# Patient Record
Sex: MALE | Race: BLACK OR AFRICAN AMERICAN | Employment: UNEMPLOYED | URBAN - METROPOLITAN AREA
[De-identification: names, ages, dates, MRNs, and addresses within clinical notes are randomized per-mention and may not be internally consistent; named-entity substitution may affect disease eponyms.]

---

## 2019-08-24 ENCOUNTER — APPOINTMENT (OUTPATIENT)
Dept: CT IMAGING | Age: 64
DRG: 389 | End: 2019-08-24
Attending: EMERGENCY MEDICINE
Payer: MEDICARE

## 2019-08-24 ENCOUNTER — HOSPITAL ENCOUNTER (INPATIENT)
Age: 64
LOS: 4 days | Discharge: LEFT BEFORE DISCHARGE | DRG: 389 | End: 2019-08-28
Attending: EMERGENCY MEDICINE | Admitting: FAMILY MEDICINE
Payer: MEDICARE

## 2019-08-24 ENCOUNTER — APPOINTMENT (OUTPATIENT)
Dept: GENERAL RADIOLOGY | Age: 64
DRG: 389 | End: 2019-08-24
Attending: EMERGENCY MEDICINE
Payer: MEDICARE

## 2019-08-24 ENCOUNTER — APPOINTMENT (OUTPATIENT)
Dept: GENERAL RADIOLOGY | Age: 64
DRG: 389 | End: 2019-08-24
Payer: MEDICARE

## 2019-08-24 DIAGNOSIS — R10.9 ACUTE ABDOMINAL PAIN: Primary | ICD-10-CM

## 2019-08-24 DIAGNOSIS — K56.609 SMALL BOWEL OBSTRUCTION (HCC): ICD-10-CM

## 2019-08-24 DIAGNOSIS — R11.2 NAUSEA AND VOMITING, INTRACTABILITY OF VOMITING NOT SPECIFIED, UNSPECIFIED VOMITING TYPE: ICD-10-CM

## 2019-08-24 PROBLEM — E11.9 DIABETES (HCC): Chronic | Status: ACTIVE | Noted: 2019-08-24

## 2019-08-24 PROBLEM — G47.30 UNSPECIFIED SLEEP APNEA: Chronic | Status: ACTIVE | Noted: 2019-08-24

## 2019-08-24 PROBLEM — E66.01 MORBID OBESITY (HCC): Chronic | Status: ACTIVE | Noted: 2019-08-24

## 2019-08-24 PROBLEM — N17.9 ACUTE RENAL FAILURE SUPERIMPOSED ON STAGE 4 CHRONIC KIDNEY DISEASE (HCC): Status: ACTIVE | Noted: 2019-08-24

## 2019-08-24 PROBLEM — R19.7 DIARRHEA: Status: ACTIVE | Noted: 2019-08-24

## 2019-08-24 PROBLEM — N18.9 ACUTE ON CHRONIC RENAL FAILURE (HCC): Chronic | Status: ACTIVE | Noted: 2019-08-24

## 2019-08-24 PROBLEM — K43.2 RECURRENT VENTRAL HERNIA: Status: ACTIVE | Noted: 2019-08-24

## 2019-08-24 PROBLEM — R74.8 ELEVATED LIPASE: Status: ACTIVE | Noted: 2019-08-24

## 2019-08-24 PROBLEM — N17.9 ACUTE ON CHRONIC RENAL FAILURE (HCC): Chronic | Status: ACTIVE | Noted: 2019-08-24

## 2019-08-24 PROBLEM — K46.9 HERNIA OF ABDOMINAL CAVITY: Chronic | Status: ACTIVE | Noted: 2019-08-24

## 2019-08-24 PROBLEM — I10 HYPERTENSION: Chronic | Status: ACTIVE | Noted: 2019-08-24

## 2019-08-24 PROBLEM — N18.4 ACUTE RENAL FAILURE SUPERIMPOSED ON STAGE 4 CHRONIC KIDNEY DISEASE (HCC): Status: ACTIVE | Noted: 2019-08-24

## 2019-08-24 PROBLEM — R63.4 UNINTENTIONAL WEIGHT LOSS: Status: ACTIVE | Noted: 2019-08-24

## 2019-08-24 PROBLEM — R53.1 WEAKNESS: Status: ACTIVE | Noted: 2019-08-24

## 2019-08-24 PROBLEM — D64.9 ANEMIA: Status: ACTIVE | Noted: 2019-08-24

## 2019-08-24 PROBLEM — K59.00 CONSTIPATION: Status: ACTIVE | Noted: 2019-08-24

## 2019-08-24 LAB
ALBUMIN SERPL-MCNC: 3.2 G/DL (ref 3.2–4.6)
ALBUMIN/GLOB SERPL: 0.6 {RATIO} (ref 1.2–3.5)
ALP SERPL-CCNC: 158 U/L (ref 50–136)
ALT SERPL-CCNC: 23 U/L (ref 12–65)
ANION GAP SERPL CALC-SCNC: 11 MMOL/L (ref 7–16)
AST SERPL-CCNC: 30 U/L (ref 15–37)
ATRIAL RATE: 106 BPM
BASOPHILS # BLD: 0 K/UL (ref 0–0.2)
BASOPHILS NFR BLD: 0 % (ref 0–2)
BILIRUB SERPL-MCNC: 0.6 MG/DL (ref 0.2–1.1)
BNP SERPL-MCNC: 38 PG/ML
BUN SERPL-MCNC: 72 MG/DL (ref 8–23)
CALCIUM SERPL-MCNC: 9.2 MG/DL (ref 8.3–10.4)
CALCULATED P AXIS, ECG09: 31 DEGREES
CALCULATED R AXIS, ECG10: -3 DEGREES
CALCULATED T AXIS, ECG11: 20 DEGREES
CHLORIDE SERPL-SCNC: 99 MMOL/L (ref 98–107)
CO2 SERPL-SCNC: 25 MMOL/L (ref 21–32)
CREAT SERPL-MCNC: 2.87 MG/DL (ref 0.8–1.5)
DIAGNOSIS, 93000: NORMAL
DIFFERENTIAL METHOD BLD: ABNORMAL
EOSINOPHIL # BLD: 0.1 K/UL (ref 0–0.8)
EOSINOPHIL NFR BLD: 1 % (ref 0.5–7.8)
ERYTHROCYTE [DISTWIDTH] IN BLOOD BY AUTOMATED COUNT: 15.1 % (ref 11.9–14.6)
EST. AVERAGE GLUCOSE BLD GHB EST-MCNC: 108 MG/DL
GLOBULIN SER CALC-MCNC: 5 G/DL (ref 2.3–3.5)
GLUCOSE BLD STRIP.AUTO-MCNC: 83 MG/DL (ref 65–100)
GLUCOSE SERPL-MCNC: 119 MG/DL (ref 65–100)
HBA1C MFR BLD: 5.4 % (ref 4.8–6)
HCT VFR BLD AUTO: 34.5 % (ref 41.1–50.3)
HGB BLD-MCNC: 11 G/DL (ref 13.6–17.2)
IMM GRANULOCYTES # BLD AUTO: 0.1 K/UL (ref 0–0.5)
IMM GRANULOCYTES NFR BLD AUTO: 1 % (ref 0–5)
INR PPP: 1
LIPASE SERPL-CCNC: 508 U/L (ref 73–393)
LYMPHOCYTES # BLD: 1.8 K/UL (ref 0.5–4.6)
LYMPHOCYTES NFR BLD: 20 % (ref 13–44)
MAGNESIUM SERPL-MCNC: 2 MG/DL (ref 1.8–2.4)
MCH RBC QN AUTO: 30 PG (ref 26.1–32.9)
MCHC RBC AUTO-ENTMCNC: 31.9 G/DL (ref 31.4–35)
MCV RBC AUTO: 94 FL (ref 79.6–97.8)
MONOCYTES # BLD: 0.9 K/UL (ref 0.1–1.3)
MONOCYTES NFR BLD: 10 % (ref 4–12)
NEUTS SEG # BLD: 6.2 K/UL (ref 1.7–8.2)
NEUTS SEG NFR BLD: 68 % (ref 43–78)
NRBC # BLD: 0 K/UL (ref 0–0.2)
P-R INTERVAL, ECG05: 136 MS
PHOSPHATE SERPL-MCNC: 3.5 MG/DL (ref 2.3–3.7)
PLATELET # BLD AUTO: 522 K/UL (ref 150–450)
PMV BLD AUTO: 11.8 FL (ref 9.4–12.3)
POTASSIUM SERPL-SCNC: 4.4 MMOL/L (ref 3.5–5.1)
PROT SERPL-MCNC: 8.2 G/DL (ref 6.3–8.2)
PROTHROMBIN TIME: 13.2 SEC (ref 11.7–14.5)
Q-T INTERVAL, ECG07: 356 MS
QRS DURATION, ECG06: 88 MS
QTC CALCULATION (BEZET), ECG08: 463 MS
RBC # BLD AUTO: 3.67 M/UL (ref 4.23–5.6)
SODIUM SERPL-SCNC: 135 MMOL/L (ref 136–145)
TROPONIN I BLD-MCNC: 0.01 NG/ML (ref 0.02–0.05)
VENTRICULAR RATE, ECG03: 102 BPM
WBC # BLD AUTO: 9.1 K/UL (ref 4.3–11.1)

## 2019-08-24 PROCEDURE — 74011250636 HC RX REV CODE- 250/636: Performed by: NURSE PRACTITIONER

## 2019-08-24 PROCEDURE — 83880 ASSAY OF NATRIURETIC PEPTIDE: CPT

## 2019-08-24 PROCEDURE — 80053 COMPREHEN METABOLIC PANEL: CPT

## 2019-08-24 PROCEDURE — 96375 TX/PRO/DX INJ NEW DRUG ADDON: CPT | Performed by: EMERGENCY MEDICINE

## 2019-08-24 PROCEDURE — 99285 EMERGENCY DEPT VISIT HI MDM: CPT | Performed by: EMERGENCY MEDICINE

## 2019-08-24 PROCEDURE — 84100 ASSAY OF PHOSPHORUS: CPT

## 2019-08-24 PROCEDURE — 85610 PROTHROMBIN TIME: CPT

## 2019-08-24 PROCEDURE — 77030008771 HC TU NG SALEM SUMP -A

## 2019-08-24 PROCEDURE — 87324 CLOSTRIDIUM AG IA: CPT

## 2019-08-24 PROCEDURE — 74011636320 HC RX REV CODE- 636/320: Performed by: EMERGENCY MEDICINE

## 2019-08-24 PROCEDURE — 81003 URINALYSIS AUTO W/O SCOPE: CPT | Performed by: EMERGENCY MEDICINE

## 2019-08-24 PROCEDURE — 74011000302 HC RX REV CODE- 302: Performed by: NURSE PRACTITIONER

## 2019-08-24 PROCEDURE — 77030019605

## 2019-08-24 PROCEDURE — 93005 ELECTROCARDIOGRAM TRACING: CPT | Performed by: EMERGENCY MEDICINE

## 2019-08-24 PROCEDURE — 86580 TB INTRADERMAL TEST: CPT | Performed by: NURSE PRACTITIONER

## 2019-08-24 PROCEDURE — 87045 FECES CULTURE AEROBIC BACT: CPT

## 2019-08-24 PROCEDURE — 71045 X-RAY EXAM CHEST 1 VIEW: CPT

## 2019-08-24 PROCEDURE — 96374 THER/PROPH/DIAG INJ IV PUSH: CPT | Performed by: EMERGENCY MEDICINE

## 2019-08-24 PROCEDURE — 82962 GLUCOSE BLOOD TEST: CPT

## 2019-08-24 PROCEDURE — 85025 COMPLETE CBC W/AUTO DIFF WBC: CPT

## 2019-08-24 PROCEDURE — 74018 RADEX ABDOMEN 1 VIEW: CPT

## 2019-08-24 PROCEDURE — 74011250636 HC RX REV CODE- 250/636: Performed by: FAMILY MEDICINE

## 2019-08-24 PROCEDURE — 74011250636 HC RX REV CODE- 250/636: Performed by: EMERGENCY MEDICINE

## 2019-08-24 PROCEDURE — 83690 ASSAY OF LIPASE: CPT

## 2019-08-24 PROCEDURE — 82272 OCCULT BLD FECES 1-3 TESTS: CPT

## 2019-08-24 PROCEDURE — 77030020255 HC SOL INJ LR 1000ML BG

## 2019-08-24 PROCEDURE — 83735 ASSAY OF MAGNESIUM: CPT

## 2019-08-24 PROCEDURE — 83036 HEMOGLOBIN GLYCOSYLATED A1C: CPT

## 2019-08-24 PROCEDURE — 74176 CT ABD & PELVIS W/O CONTRAST: CPT

## 2019-08-24 PROCEDURE — 84484 ASSAY OF TROPONIN QUANT: CPT

## 2019-08-24 PROCEDURE — 65270000029 HC RM PRIVATE

## 2019-08-24 PROCEDURE — 36415 COLL VENOUS BLD VENIPUNCTURE: CPT

## 2019-08-24 RX ORDER — MORPHINE SULFATE 2 MG/ML
2 INJECTION, SOLUTION INTRAMUSCULAR; INTRAVENOUS
Status: COMPLETED | OUTPATIENT
Start: 2019-08-24 | End: 2019-08-24

## 2019-08-24 RX ORDER — OMEPRAZOLE 20 MG/1
20 CAPSULE, DELAYED RELEASE ORAL DAILY
COMMUNITY

## 2019-08-24 RX ORDER — ONDANSETRON 2 MG/ML
4 INJECTION INTRAMUSCULAR; INTRAVENOUS
Status: COMPLETED | OUTPATIENT
Start: 2019-08-24 | End: 2019-08-24

## 2019-08-24 RX ORDER — DIPHENHYDRAMINE HYDROCHLORIDE 50 MG/ML
12.5 INJECTION, SOLUTION INTRAMUSCULAR; INTRAVENOUS
Status: DISCONTINUED | OUTPATIENT
Start: 2019-08-24 | End: 2019-08-28 | Stop reason: HOSPADM

## 2019-08-24 RX ORDER — MORPHINE SULFATE 2 MG/ML
8 INJECTION, SOLUTION INTRAMUSCULAR; INTRAVENOUS
Status: DISCONTINUED | OUTPATIENT
Start: 2019-08-24 | End: 2019-08-26

## 2019-08-24 RX ORDER — LORAZEPAM 2 MG/ML
1 INJECTION INTRAMUSCULAR
Status: DISCONTINUED | OUTPATIENT
Start: 2019-08-24 | End: 2019-08-28 | Stop reason: HOSPADM

## 2019-08-24 RX ORDER — NALOXONE HYDROCHLORIDE 0.4 MG/ML
0.4 INJECTION, SOLUTION INTRAMUSCULAR; INTRAVENOUS; SUBCUTANEOUS AS NEEDED
Status: DISCONTINUED | OUTPATIENT
Start: 2019-08-24 | End: 2019-08-28 | Stop reason: HOSPADM

## 2019-08-24 RX ORDER — HEPARIN SODIUM 5000 [USP'U]/ML
5000 INJECTION, SOLUTION INTRAVENOUS; SUBCUTANEOUS EVERY 8 HOURS
Status: DISCONTINUED | OUTPATIENT
Start: 2019-08-24 | End: 2019-08-28 | Stop reason: HOSPADM

## 2019-08-24 RX ORDER — ALLOPURINOL 300 MG/1
300 TABLET ORAL DAILY
COMMUNITY

## 2019-08-24 RX ORDER — COLCHICINE 0.6 MG/1
0.6 TABLET ORAL
COMMUNITY

## 2019-08-24 RX ORDER — LANOLIN ALCOHOL/MO/W.PET/CERES
1000 CREAM (GRAM) TOPICAL DAILY
COMMUNITY

## 2019-08-24 RX ORDER — HYDROMORPHONE HYDROCHLORIDE 1 MG/ML
1 INJECTION, SOLUTION INTRAMUSCULAR; INTRAVENOUS; SUBCUTANEOUS
Status: DISCONTINUED | OUTPATIENT
Start: 2019-08-24 | End: 2019-08-24

## 2019-08-24 RX ORDER — METHOCARBAMOL 500 MG/1
500 TABLET, FILM COATED ORAL
COMMUNITY

## 2019-08-24 RX ORDER — SODIUM BICARBONATE 650 MG/1
650 TABLET ORAL 4 TIMES DAILY
COMMUNITY

## 2019-08-24 RX ORDER — FUROSEMIDE 40 MG/1
40 TABLET ORAL AS NEEDED
COMMUNITY

## 2019-08-24 RX ORDER — ONDANSETRON 2 MG/ML
4 INJECTION INTRAMUSCULAR; INTRAVENOUS
Status: DISCONTINUED | OUTPATIENT
Start: 2019-08-24 | End: 2019-08-28 | Stop reason: HOSPADM

## 2019-08-24 RX ORDER — CARVEDILOL 12.5 MG/1
12.5 TABLET ORAL 2 TIMES DAILY WITH MEALS
COMMUNITY

## 2019-08-24 RX ORDER — HYDRALAZINE HYDROCHLORIDE 20 MG/ML
10 INJECTION INTRAMUSCULAR; INTRAVENOUS
Status: DISCONTINUED | OUTPATIENT
Start: 2019-08-24 | End: 2019-08-28 | Stop reason: HOSPADM

## 2019-08-24 RX ORDER — SODIUM CHLORIDE, SODIUM LACTATE, POTASSIUM CHLORIDE, CALCIUM CHLORIDE 600; 310; 30; 20 MG/100ML; MG/100ML; MG/100ML; MG/100ML
150 INJECTION, SOLUTION INTRAVENOUS CONTINUOUS
Status: DISCONTINUED | OUTPATIENT
Start: 2019-08-24 | End: 2019-08-25

## 2019-08-24 RX ORDER — INSULIN LISPRO 100 [IU]/ML
INJECTION, SOLUTION INTRAVENOUS; SUBCUTANEOUS
Status: DISCONTINUED | OUTPATIENT
Start: 2019-08-24 | End: 2019-08-27

## 2019-08-24 RX ORDER — NIFEDIPINE 60 MG/1
60 TABLET, EXTENDED RELEASE ORAL DAILY
COMMUNITY

## 2019-08-24 RX ORDER — HYDRALAZINE HYDROCHLORIDE 20 MG/ML
20 INJECTION INTRAMUSCULAR; INTRAVENOUS
Status: COMPLETED | OUTPATIENT
Start: 2019-08-24 | End: 2019-08-24

## 2019-08-24 RX ORDER — CYANOCOBALAMIN (VITAMIN B-12) 1000 MCG
1 TABLET, EXTENDED RELEASE ORAL
COMMUNITY

## 2019-08-24 RX ADMIN — MORPHINE SULFATE 2 MG: 2 INJECTION, SOLUTION INTRAMUSCULAR; INTRAVENOUS at 11:50

## 2019-08-24 RX ADMIN — DIATRIZOATE MEGLUMINE AND DIATRIZOATE SODIUM 15 ML: 660; 100 LIQUID ORAL; RECTAL at 11:50

## 2019-08-24 RX ADMIN — ONDANSETRON 4 MG: 2 INJECTION INTRAMUSCULAR; INTRAVENOUS at 15:41

## 2019-08-24 RX ADMIN — HYDRALAZINE HYDROCHLORIDE 20 MG: 20 INJECTION INTRAMUSCULAR; INTRAVENOUS at 11:49

## 2019-08-24 RX ADMIN — MORPHINE SULFATE 8 MG: 2 INJECTION, SOLUTION INTRAMUSCULAR; INTRAVENOUS at 21:31

## 2019-08-24 RX ADMIN — ONDANSETRON 4 MG: 2 INJECTION INTRAMUSCULAR; INTRAVENOUS at 11:50

## 2019-08-24 RX ADMIN — SODIUM CHLORIDE, SODIUM LACTATE, POTASSIUM CHLORIDE, AND CALCIUM CHLORIDE 125 ML/HR: 600; 310; 30; 20 INJECTION, SOLUTION INTRAVENOUS at 17:30

## 2019-08-24 RX ADMIN — TUBERCULIN PURIFIED PROTEIN DERIVATIVE 5 UNITS: 5 INJECTION, SOLUTION INTRADERMAL at 17:28

## 2019-08-24 RX ADMIN — HEPARIN SODIUM 5000 UNITS: 5000 INJECTION INTRAVENOUS; SUBCUTANEOUS at 17:28

## 2019-08-24 NOTE — H&P
Hospitalist Admission History and Physical       CHIEF COMPLAINT:  Post op intractable nausea and vomiting, abdominal pain. Subjective:   Patient is a morbidly obese 71 y.o.  male who presents to ER this afternoon with an approximate 6 day history of waxing and waning diffuse, crampy abdominal pain, \"all over,\" along with intractable nausea and vomiting. Reports the worst of the pain as just under diaphragm. He has not retained solids or liquids in that time frame. He reports a very small amount semi-formed stool expelled about 3 days ago, prior to that he has had brown and yellow liquid stools in very small amounts. Rarely passing gas. He had a lap umbilical hernia repair and a posterior lipoma resection in July at [facility] that seems to have been incarcerated, then patient describes some type of right lower quad abscess (?) aspiration 2 days post op. He reports he never had a normal BM post op and was readmitted for ten days and two days respectively for persistent SBO with questionable post op ventral hernia. Last admission 8/15-18 at [facility]. He states he felt marginally better on discharge, but when he attempted to eat again, began to vomit. He signed out AMA from the most recent admission Both admissions, he was treated conservatively with bowel rest, N/G, antiemetics and IVF. In ER, he is found with persistent SBO, N/G placed with gastric fluid return, and administered antiemetics. Additional history as noted below. Past Medical History:   Diagnosis Date    Arthritis     Chronic pain     arthritic    CKD (chronic kidney disease):  Stage 3-4 2018    BASELINE CREATININE IN LOW 2'S    Colon polyps 2018    Hernia of abdominal cavity 70/90/9250    POST OP UMBILICAL HERNIA 7/75 WITH RESULTANT VENTRAL HERNIA, NON INCARCERATED.      Hypertension     ON MEDS    Morbid obesity ([address]     BMI 47    Non-insulin dependent type 2 diabetes mellitus ([facility] [address]     WAS TOLD HE DID NOT HAVE DM ANY MORE IN [place] OF 2019. UNKNOWN A1 C    Unintentional weight loss 8/24/2019    50# POST OP July-AUGUST 2019    Unspecified sleep apnea     does not require a c-pap    Ventral hernia JULY 2019 POST OP    OF NOTE:  1  1. Took glipizide and metformin until July of this year, PHP told him he was no longer diabetic. 2.  Also took lisinopril, maxide, hydralazine, and lasix 80 mg daily until July of this year also, states he was taken off both by [facility]. Occasionally takes lasix 40 mg now. 3.  Suspect strong component of noncompliance. Past Surgical History:   Procedure Laterality Date    HX COLONOSCOPY      HX HERNIA REPAIR  07/2019        Family History   Problem Relation Age of Onset    Diabetes Mother     Cancer Sister     Stroke Sister     Heart Disease Sister        Social History     Social History Narrative    8/24:  PATIENT IS RETIRED, LIVES WITH WIFE Eva Wright. ([phone]). HE DOES NOT HAVE ANY CHILDREN. Social History     Substance and Sexual Activity   Drug Use No       Allergies   Allergen Reactions    Pcn [Penicillins] Hives       Prior to Admission medications    Medication Sig Start Date End Date Taking? Authorizing Provider   carvedilol (COREG) 12.5 mg tablet Take 12.5 mg by mouth two (2) times daily (with meals). Yes Provider, Historical   furosemide (LASIX) 40 mg tablet Take 40 mg by mouth as needed (USUALLY TAKES 2X PER WEEK AS OF JULY 2019. WAS TAKING 80 MG DAILY UNTIL THEN). Yes Provider, Historical   ergocalciferol, vitamin D2, (CALCIDOL PO) Take 0.25 mg by mouth daily. Yes Provider, Historical   NIFEdipine ER (NIFEDICAL XL) 60 mg ER tablet Take 60 mg by mouth daily. Yes Provider, Historical   omeprazole (PRILOSEC) 20 mg capsule Take 20 mg by mouth daily. Yes Provider, Historical   methocarbamol (ROBAXIN) 500 mg tablet Take 500 mg by mouth three (3) times daily as needed for Other (MUSCLE SPASM).    Yes Provider, Historical   ferrous sulfate (IRON) 325 mg (65 mg iron) cpER Take 1 Tab by mouth nightly. Yes Provider, Historical   sodium bicarbonate 650 mg tablet Take 650 mg by mouth four (4) times daily. Yes Provider, Historical   allopurinol (ZYLOPRIM) 300 mg tablet Take 300 mg by mouth daily. Yes Provider, Historical   selenium 200 mcg cap Take 1 Cap by mouth every seven (7) days. Yes Provider, Historical   cyanocobalamin 1,000 mcg tablet Take 1,000 mcg by mouth daily. Yes Provider, Historical   GINSENG PO Take 250 mg by mouth two (2) times a week. Yes Provider, Historical   ubidecarenone/vitamin E mixed (COQ10  PO) Take 200 mg by mouth every seven (7) days. Yes Provider, Historical   colchicine 0.6 mg tablet Take 0.6 mg by mouth daily as needed. Yes Provider, Historical   aspirin 81 mg chewable tablet Take 81 mg by mouth daily. Holding for surgery    Other, MD Deepika     PHP:  Unable to remember in [facility]  Nephrology: Dr Royal Ellison in [facility]  Does not see a Cardiologist    Review of Systems  A comprehensive review of systems was negative except for that written in the HPI. Objective:     Visit Vitals  /85   Pulse 97   Temp 98.2 °F (36.8 °C)   Resp 16   Ht 5' 6\" (1.676 m)   Wt 113.4 kg (250 lb)   SpO2 98%   BMI 40.35 kg/m²      Data Review:   Recent Results (from the past 24 hour(s))   CBC WITH AUTOMATED DIFF    Collection Time: 08/24/19 11:25 AM   Result Value Ref Range    WBC 9.1 4.3 - 11.1 K/uL    RBC 3.67 (L) 4.23 - 5.6 M/uL    HGB 11.0 (L) 13.6 - 17.2 g/dL    HCT 34.5 (L) 41.1 - 50.3 %    MCV 94.0 79.6 - 97.8 FL    MCH 30.0 26.1 - 32.9 PG    MCHC 31.9 31.4 - 35.0 g/dL    RDW 15.1 (H) 11.9 - 14.6 %    PLATELET 710 (H) 119 - 450 K/uL    MPV 11.8 9.4 - 12.3 FL    ABSOLUTE NRBC 0.00 0.0 - 0.2 K/uL    DF AUTOMATED      NEUTROPHILS 68 43 - 78 %    LYMPHOCYTES 20 13 - 44 %    MONOCYTES 10 4.0 - 12.0 %    EOSINOPHILS 1 0.5 - 7.8 %    BASOPHILS 0 0.0 - 2.0 %    IMMATURE GRANULOCYTES 1 0.0 - 5.0 %    ABS. NEUTROPHILS 6.2 1.7 - 8.2 K/UL    ABS. LYMPHOCYTES 1.8 0.5 - 4.6 K/UL    ABS. MONOCYTES 0.9 0.1 - 1.3 K/UL    ABS. EOSINOPHILS 0.1 0.0 - 0.8 K/UL    ABS. BASOPHILS 0.0 0.0 - 0.2 K/UL    ABS. IMM. GRANS. 0.1 0.0 - 0.5 K/UL   METABOLIC PANEL, COMPREHENSIVE    Collection Time: 08/24/19 11:25 AM   Result Value Ref Range    Sodium 135 (L) 136 - 145 mmol/L    Potassium 4.4 3.5 - 5.1 mmol/L    Chloride 99 98 - 107 mmol/L    CO2 25 21 - 32 mmol/L    Anion gap 11 7 - 16 mmol/L    Glucose 119 (H) 65 - 100 mg/dL    BUN 72 (H) 8 - 23 MG/DL    Creatinine 2.87 (H) 0.8 - 1.5 MG/DL    GFR est AA 29 (L) >60 ml/min/1.73m2    GFR est non-AA 24 (L) >60 ml/min/1.73m2    Calcium 9.2 8.3 - 10.4 MG/DL    Bilirubin, total 0.6 0.2 - 1.1 MG/DL    ALT (SGPT) 23 12 - 65 U/L    AST (SGOT) 30 15 - 37 U/L    Alk. phosphatase 158 (H) 50 - 136 U/L    Protein, total 8.2 6.3 - 8.2 g/dL    Albumin 3.2 3.2 - 4.6 g/dL    Globulin 5.0 (H) 2.3 - 3.5 g/dL    A-G Ratio 0.6 (L) 1.2 - 3.5     LIPASE    Collection Time: 08/24/19 11:25 AM   Result Value Ref Range    Lipase 508 (H) 73 - 393 U/L   BNP    Collection Time: 08/24/19 11:25 AM   Result Value Ref Range    BNP 38 (H) 0 pg/mL   POC TROPONIN-I    Collection Time: 08/24/19 11:29 AM   Result Value Ref Range    Troponin-I (POC) 0.01 (L) 0.02 - 0.05 ng/ml   EKG, 12 LEAD, INITIAL    Collection Time: 08/24/19 11:35 AM   Result Value Ref Range    Ventricular Rate 102 BPM    Atrial Rate 106 BPM    P-R Interval 136 ms    QRS Duration 88 ms    Q-T Interval 356 ms    QTC Calculation (Bezet) 463 ms    Calculated P Axis 31 degrees    Calculated R Axis -3 degrees    Calculated T Axis 20 degrees    Diagnosis       !! AGE AND GENDER SPECIFIC ECG ANALYSIS !! Sinus tachycardia  Minimal voltage criteria for LVH, may be normal variant  Borderline ECG  No previous ECGs available       Old records reviewed. PHYSICAL EXAM:  General appearance: Oriented and alert, cooperative, ill appearing. Unkempt. Morbidly obese despite 50# weight loss in the past month. Wife at bedside. Head: Normocephalic, without obvious abnormality, atraumatic  Eyes: conjunctivae/corneas clear. PERRL  Throat: Lips, mucosa, and tongue normal. Teeth and gums dry. Neck: supple, symmetrical, trachea midline, no JVD  Lungs: clear to auscultation bilaterally, slightly diminished in bases. Heart: regular rate and rhythm, S1, S2 normal, no murmur, click, rub or gallop  Abdomen: slightly tense. No rebound or guarding, minimal increased pain with palpation. Rare, faint, tinkling bowel sounds noted. Worst pain in bilateral upper quads. Ventral hernia palpable, does not seem to be incarcerated. Extremities: extremities normal, atraumatic, no cyanosis or edema  Skin: Skin color, texture, turgor normal. No rashes or lesions  Neurologic: Grossly normal    Assessment/Plan:   Intractable nausea and vomiting, intermittent diarrhea post op    Dietician consult for possible TPN   Antiemetics and analgesics   PPI IV daily   N/G to LIS   Daily weight   LR at 125 ml/hr   NPO   I+O  Abdominal pain due to SBO    As above  Persistent, recurrent post op Small bowel obstruction   As above   If not resolving in a few days will need surgical consult   S/P umbilical hernia repair and posterior lipoma removal July 2019 with subsequent ventral hernia  Unspecified sleep apnea:  does not require a c-pap  Elevated lipase   Monitor   Morbid obesity with BMI 54   Reports unintentional 50# weight loss post op  Hypertension: on meds at home   Use IV hydralazine prn   NIDDM:  7/2019:  PATIENT INFORMED HE DOES NOT HAVE DIABETES ANY MORE.   GLUCOTROL AND METFORMIN DISCONTINUED at that time   Checking A1C   Routine SSI until A1C is resulted   Generalized weakness   PT/OT consults   CM for discharge planning   Fall precautions  Acute on chronic renal failure, stage 3-4:  Baseline creatinine in low 2's   Daily BMP   Continue IVF   I+O, daily weight   Anemia:  Suspect multifactorial   Monitor daily      Full code  Heparin and SCDs for DVT prophy   Seen and examined by Dr Jasson Ruff also  Plan discussed with Dr Jasson Ruff, Patient, wife, all are in agreement     Signed By: Yovani Tim NP     August 24, 2019

## 2019-08-24 NOTE — ED NOTES
Pt resting on stretcher at this time. S/O at bedside. Pt provided with warm blanket. Offered dim lights and pt declined. VSS and cycling.

## 2019-08-24 NOTE — CONSULTS
H&P/Consult Note/Progress Note/Office Note:   Michael Brennan  MRN: [id number]  :1955  Age:64 y.o.    HPI: Michael Brennan is a 71 y.o. male with h/o CHF, obesity, DM, CKD who recently had surgery at [facility].    19 s/p robotic assisted 8cm ventral hernia repair with 4.5cm x 11.4cm Ventralight STmesh at [facility]   (and benign lipoma resection from back) by Dr Mendy Galaviz ([phone])   DIAGNOSIS: BENIGN FATTY TISSUE, CONSISTENT WITH LIPOMA. Aide Vizcaino M.D.  (Electronically Signed)  He describes having a seroma aspirated by Dr Berenice Pedraza post-op. 8/15/19 He was admitted to [facility] with recurrent hernia and SBO and followed by surgery there with NGT  He did not have surgery and was discharged after approx 2 days    8/15/19 CT abd/pelvis at [facility]  IMPRESSION:  [address], SEEN ON THE PREVIOUS STUDY OF 2019, WITHOUT  BOWEL CONTENT BUT WITH SIGNIFICANT PERITONEAL FAT CONTENT, POSSIBLY RELATED TO  THE SMALL BOWEL OBSTRUCTION    INCIDENTALLY OBSERVED IS UNCOMPLICATED CHOLELITHIASIS    MODERATE BILATERAL RENAL ATROPHY      He came to the [facility] ER on 19 with progressive, constant, severe N/V/abd for >1 week. Abd pain involved upper abdomen and was generalized and non-radiating  CT is shown below with recurrent SBO  Nothing made symptoms better or worse. No associated fevers  He was admitted by Hospitalists  Gen Surgery was consulted by Dr Cyn Mina. Shreya Brody who called our service. NGT placed and palcement confirmed on abd Xray        19 CT abd/pelvis with oral but no IV contrast  Hx: Abd pain, generalized, with vomiting. Cough. 50 pound weight loss in 1month      FINDINGS: There is minimal bibasilar atelectasis.     CT ABD: No contour deforming abnormality of the liver. Multiple low-density  lesions seen within the spleen. Gallstones present in the gallbladder.  The  adrenal glands and pancreas are normal. No renal or ureteral calculi  demonstrated. There are multiple dilated loops of small bowel measuring up to 6  cm. It is difficult to identify a zone of transition, but multiple decompressed  loops of small bowel are present in the right lower quadrant, and there is  colonic decompression present as well. There is a fluid-filled supraumbilical  abdominal wall hernia present, midline.     CT PELVIS: The bladder and rectum are normal. No pelvic adenopathy demonstrated. Small pelvic free fluid present.     Bone window evaluation demonstrates no aggressive osseous lesions.       IMPRESSION:  1. Findings compatible with small bowel obstruction with multiple dilated loops  of small bowel seen within the upper abdomen. It is difficult to identify a zone  of transition, but multiple decompressed loops of small bowel are present in the  right lower quadrant. 2. Fluid-containing supraumbilical ventral abdominal wall hernia. 3. Cholelithiasis. 4. Multiple low-density lesions within the spleen which cannot be further  characterized on this exam. Correlation with targeted sonography, as an  outpatient on a nonemergent basis, recommended for further evaluation      He previously had colonoscopy at [facility] as shown below. 10/25/18 s/p colonoscopy at [facility]; Dr Joey Buenrostro MD  \"Normal\" ; no specimens; next planned in 5yrs          Past Medical History:   Diagnosis Date    Arthritis     Chronic pain     arthritic    CKD (chronic kidney disease) 2018    BASELINE CREATININE IN LOW 2'S    Colon polyps 2018    Hernia of abdominal cavity 80/82/0490    POST OP UMBILICAL HERNIA 2/16 WITH RESULTANT VENTRAL HERNIA, NON INCARCERATED.  Hypertension     ON MEDS    Morbid obesity ([address]     BMI 47    Non-insulin dependent type 2 diabetes mellitus ([address]     WAS TOLD HE DID NOT HAVE DM ANY MORE IN [place] OF 2019.   UNKNOWN A1 C    Unintentional weight loss 8/24/2019    50# POST OP July-AUGUST 2019    Unspecified sleep apnea     does not require a c-pap  Ventral hernia JULY 2019 POST OP     Past Surgical History:   Procedure Laterality Date    HX COLONOSCOPY      HX HERNIA REPAIR  07/2019     Current Facility-Administered Medications   Medication Dose Route Frequency    lactated Ringers infusion  125 mL/hr IntraVENous CONTINUOUS    naloxone (NARCAN) injection 0.4 mg  0.4 mg IntraVENous PRN    ondansetron (ZOFRAN) injection 4 mg  4 mg IntraVENous Q4H PRN    promethazine (PHENERGAN) with saline injection 12.5 mg  12.5 mg IntraVENous Q4H PRN    diphenhydrAMINE (BENADRYL) injection 12.5 mg  12.5 mg IntraVENous Q4H PRN    LORazepam (ATIVAN) injection 1 mg  1 mg IntraVENous Q6H PRN    heparin (porcine) injection 5,000 Units  5,000 Units SubCUTAneous Q8H    insulin lispro (HUMALOG) injection   SubCUTAneous AC&HS    tuberculin injection 5 Units  5 Units IntraDERMal ONCE    [START ON 8/25/2019] pantoprazole (PROTONIX) 40 mg in sodium chloride 0.9% 10 mL injection  40 mg IntraVENous DAILY    hydrALAZINE (APRESOLINE) 20 mg/mL injection 10 mg  10 mg IntraVENous Q6H PRN    morphine injection 8 mg  8 mg IntraVENous Q4H PRN     Pcn [penicillins]  Social History     Socioeconomic History    Marital status:      Spouse name: Not on file    Number of children: Not on file    Years of education: Not on file    Highest education level: Not on file   Tobacco Use    Smoking status: Never Smoker    Smokeless tobacco: Never Used   Substance and Sexual Activity    Alcohol use: No    Drug use: No   Social History Narrative    8/24:  PATIENT IS RETIRED, LIVES WITH WIFE PHAM. ([phone]). HE DOES NOT HAVE ANY CHILDREN. Social History     Tobacco Use   Smoking Status Never Smoker   Smokeless Tobacco Never Used     Family History   Problem Relation Age of Onset    Diabetes Mother     Cancer Sister     Stroke Sister     Heart Disease Sister      ROS: The patient has no difficulty with chest pain or shortness of breath. No fever or chills. Comprehensive review of systems was otherwise unremarkable except as noted above. Physical Exam:   Visit Vitals  /83   Pulse (!) 103   Temp 98.1 °F (36.7 °C)   Resp 20   Ht 5' 6\" (1.676 m)   Wt 250 lb (113.4 kg)   SpO2 97%   BMI 40.35 kg/m²     Vitals:    08/24/19 1559 08/24/19 1629 08/24/19 1722 08/24/19 1917   BP: 154/85 182/83 145/89 151/83   Pulse:  96 (!) 110 (!) 103   Resp:   19 20   Temp:   98.2 °F (36.8 °C) 98.1 °F (36.7 °C)   SpO2: 98% 97% 98% 97%   Weight:       Height:         No intake/output data recorded. No intake/output data recorded. Constitutional: Alert, oriented, cooperative patient in no acute distress; appears stated age    Eyes:Sclera are clear. EOMs intact  ENMT: no external lesions gross hearing normal; no obvious neck masses, no ear or lip lesions, nares normal; +NGT  CV: RRR. Normal perfusion  Resp: No JVD. Breathing is  non-labored; no audible wheezing. GI: obese, healed trochar sites; cannot palpate hernia but his obesity limits exam; Upper abdomen is tender; No guarding or rebound      Musculoskeletal: unremarkable with normal function. No embolic signs or cyanosis.    Neuro:  Oriented; moves all 4; no focal deficits  Psychiatric: normal affect and mood, no memory impairment    Recent vitals (if inpt):  Patient Vitals for the past 24 hrs:   BP Temp Pulse Resp SpO2 Height Weight   08/24/19 1917 151/83 98.1 °F (36.7 °C) (!) 103 20 97 %     08/24/19 1722 145/89 98.2 °F (36.8 °C) (!) 110 19 98 %     08/24/19 1629 182/83  96  97 %     08/24/19 1559 154/85    98 %     08/24/19 1541     100 %     08/24/19 1540     100 %     08/24/19 1531     98 %     08/24/19 1530 153/84    98 %     08/24/19 1330    16 98 %     08/24/19 1329 155/83   12 98 %     08/24/19 1314 142/71    98 %     08/24/19 1259 126/56    98 %     08/24/19 1244 122/56    99 %     08/24/19 1229 138/60    99 %     08/24/19 1149 (!) 163/104  97       08/24/19 1114 (!) 161/139 98.2 °F (36.8 °C) (!) 113 18 99 % 5' 6\" (1.676 m) 250 lb (113.4 kg)       Labs:  Recent Labs     08/24/19  1724 08/24/19  1125   WBC  --  9.1   HGB  --  11.0*   PLT  --  522*   NA  --  135*   K  --  4.4   CL  --  99   CO2  --  25   BUN  --  72*   CREA  --  2.87*   GLU  --  119*   PTP 13.2  --    INR 1.0  --    TBILI  --  0.6   SGOT  --  30   ALT  --  23   AP  --  158*   LPSE  --  508*       Lab Results   Component Value Date/Time    WBC 9.1 08/24/2019 11:25 AM    HGB 11.0 (L) 08/24/2019 11:25 AM    PLATELET 245 (H) 30/36/5997 11:25 AM    Sodium 135 (L) 08/24/2019 11:25 AM    Potassium 4.4 08/24/2019 11:25 AM    Chloride 99 08/24/2019 11:25 AM    CO2 25 08/24/2019 11:25 AM    BUN 72 (H) 08/24/2019 11:25 AM    Creatinine 2.87 (H) 08/24/2019 11:25 AM    Glucose 119 (H) 08/24/2019 11:25 AM    INR 1.0 08/24/2019 05:24 PM    Bilirubin, total 0.6 08/24/2019 11:25 AM    AST (SGOT) 30 08/24/2019 11:25 AM    ALT (SGPT) 23 08/24/2019 11:25 AM    Alk. phosphatase 158 (H) 08/24/2019 11:25 AM    Lipase 508 (H) 08/24/2019 11:25 AM       CT Results  (Last 48 hours)               08/24/19 1403  CT ABD PELV WO CONT Final result    Impression:  IMPRESSION:   1. Findings compatible with small bowel obstruction with multiple dilated loops   of small bowel seen within the upper abdomen. It is difficult to identify a zone   of transition, but multiple decompressed loops of small bowel are present in the   right lower quadrant. 2. Fluid-containing supraumbilical ventral abdominal wall hernia. 3. Cholelithiasis. 4. Multiple low-density lesions within the spleen which cannot be further   characterized on this exam. Correlation with targeted sonography, as an   outpatient on a nonemergent basis, recommended for further evaluation. Narrative:  History: Abdominal pain, generalized, with vomiting. Cough. 50 pound weight loss   in one month.        EXAM: CT abdomen and pelvis with oral contrast only TECHNIQUE: Thin section axial CT images are obtained from the lung bases through   the pubic symphysis. Radiation dose reduction techniques were used for this   study. Our CT scanners use one or all of the following: Automated exposure   control, adjustment of the mA and/or kV according to patient size, use of   iterative reconstruction. No comparison       FINDINGS: There is minimal bibasilar atelectasis. CT ABDOMEN: No contour deforming abnormality of the liver. Multiple low-density   lesions seen within the spleen. Gallstones present in the gallbladder. The   adrenal glands and pancreas are normal. No renal or ureteral calculi   demonstrated. There are multiple dilated loops of small bowel measuring up to 6   cm. It is difficult to identify a zone of transition, but multiple decompressed   loops of small bowel are present in the right lower quadrant, and there is   colonic decompression present as well. There is a fluid-filled supraumbilical   abdominal wall hernia present, midline. CT PELVIS: The bladder and rectum are normal. No pelvic adenopathy demonstrated. Small pelvic free fluid present. Bone window evaluation demonstrates no aggressive osseous lesions. chest X-ray      I reviewed recent labs, recent radiologic studies, and pertinent records including other doctor notes if needed. I independently reviewed radiology images for studies I described above or studies I have ordered.    Admission date (for inpatients): 8/24/2019   * No surgery found *  * No surgery found *    ASSESSMENT/PLAN:  Problem List  Date Reviewed: 8/24/2019          Codes Class Noted    * (Principal) Small bowel obstruction ([facility] [address] ICD-10-CM: U55.437  ICD-9-CM: 560.9  8/24/2019        Unspecified sleep apnea (Chronic) ICD-10-CM: G47.30  ICD-9-CM: 780.57  8/24/2019    Overview Signed 8/24/2019  4:31 PM by Do Devine NP     does not require a c-pap             Morbid obesity ([facility] [address] (Chronic) ICD-10-CM: E66.01  ICD-9-CM: 278.01  8/24/2019    Overview Signed 8/24/2019  4:32 PM by Zoe Montague NP     BMI 54  HAS LOST 50# POST OP July 2019 UNTIL AUGUST 24, 2019             Unintentional weight loss ICD-10-CM: R63.4  ICD-9-CM: 783.21  8/24/2019    Overview Signed 8/24/2019  4:33 PM by Zoe Montague NP     50# POST OP July-AUGUST 2019             Hypertension (Chronic) ICD-10-CM: I10  ICD-9-CM: 401.9  8/24/2019        Diabetes ([facility] [address] (Chronic) ICD-10-CM: E11.9  ICD-9-CM: 250.00  8/24/2019    Overview Signed 8/24/2019  4:34 PM by Zoe Montague NP     7/2019:  PATIENT INFORMED HE DOES NOT HAVE DIABETES ANY MORE. GLUCOTROL AND METFORMIN DISCONTINUED. Hernia of abdominal cavity (Chronic) ICD-10-CM: K46.9  ICD-9-CM: 553.9  8/24/2019    Overview Signed 8/24/2019  4:35 PM by Zoe Montague NP     POST OP UMBILICAL HERNIA 1/81 WITH RESULTANT VENTRAL HERNIA, NON INCARCERATED. Diarrhea ICD-10-CM: R19.7  ICD-9-CM: 787.91  8/24/2019    Overview Signed 8/24/2019  4:36 PM by Zoe Montague NP     INTERMITTENT SINCE SURGERY 7/2019             Weakness ICD-10-CM: R53.1  ICD-9-CM: 780.79  8/24/2019    Overview Signed 8/24/2019  4:36 PM by Zoe Montague NP     ARMS AND LEGS MOSTLY.               Acute renal failure superimposed on stage 4 chronic kidney disease ([facility] [address] ICD-10-CM: N17.9, N18.4  ICD-9-CM: 584.9, 585.4  8/24/2019    Overview Signed 8/24/2019  4:37 PM by SHAW Vargas  2'S  NEPHROLOGY:  YANIV LOYOLA              Intractable nausea and vomiting ICD-10-CM: R11.2  ICD-9-CM: 536.2  8/24/2019    Overview Signed 8/24/2019  4:37 PM by Zeo Montague NP     INTERMITTENTLY [facility] SURGERY July 2019             Abdominal pain (Chronic) ICD-10-CM: R10.9  ICD-9-CM: 789.00  8/24/2019        Constipation ICD-10-CM: K59.00  ICD-9-CM: 564.00  8/24/2019        Anemia ICD-10-CM: D64.9  ICD-9-CM: 285.9  8/24/2019 Elevated lipase ICD-10-CM: R74.8  ICD-9-CM: 790.5  8/24/2019        Recurrent ventral hernia ICD-10-CM: K43.2  ICD-9-CM: 553.21  8/24/2019            Principal Problem:    Small bowel obstruction ([facility] [address] (8/24/2019)    Active Problems:    Unspecified sleep apnea (8/24/2019)      Overview: does not require a c-pap      Morbid obesity ([facility] [address] [address] (8/24/2019)      Overview: BMI 54      HAS LOST 50# POST OP July 2019 UNTIL AUGUST 24, 2019      Unintentional weight loss (8/24/2019)      Overview: 50# POST OP July-AUGUST 2019      Hypertension (8/24/2019)      Diabetes ([facility] [address] (8/24/2019)      Overview: 7/2019:  PATIENT INFORMED HE DOES NOT HAVE DIABETES ANY MORE. GLUCOTROL       AND METFORMIN DISCONTINUED. Hernia of abdominal cavity (8/24/2019)      Overview: POST OP UMBILICAL HERNIA 3/75 WITH RESULTANT VENTRAL HERNIA, NON       INCARCERATED. Diarrhea (8/24/2019)      Overview: INTERMITTENT SINCE SURGERY 7/2019      Weakness (8/24/2019)      Overview: ARMS AND LEGS MOSTLY.        Acute renal failure superimposed on stage 4 chronic kidney disease ([facility] [address] (8/24/2019)      Overview: BASELINE CREATININE IN LOW 2'S      NEPHROLOGY:  YANIV MONTEJO S[place]       Intractable nausea and vomiting (8/24/2019)      Overview: INTERMITTENTLY SINCE SURGERY July 2019      Abdominal pain (8/24/2019)      Constipation (8/24/2019)      Anemia (8/24/2019)      Elevated lipase (8/24/2019)      Recurrent ventral hernia (8/24/2019)         Recurrent SBO  Recent robotic ventral hernia surgery with large piece of mesh at [facility] by Dr Parviz Dimas  He may have a mesh complication with bowel involvement  I told pt that I do not perform robotic ventral hernia repair  NPO/IVF  Bowel rest  NGT-->LISx    Possible hernia recurrence vs post-op seroma on CT  Suspect this is only a seroma and not a recurrent hernia based on hx and images  Follow    Renal Insufficiency  Unsure of baseline as we have no old labs  He thinks baseline Cr approx 2.5  Hydrate   Daily labs    Morbid obesity  Encourage weight loss              I have personally performed a face-to-face diagnostic evaluation and management  service on this patient. I have independently seen the patient. I have independently obtained the above history from the patient/family. I have independently examined the patient with above findings. I have independently reviewed data/labs for this patient and developed the above plan of care (MDM). Signed: Clint Reyes.  Bola Matos MD, FACS

## 2019-08-24 NOTE — ED TRIAGE NOTES
Pt states abdominal pain generalized in center, vomiting. Unable to keep anything down. Pt states he is coughing up white sputum. Pt states he was told yesterday by home health he may have heart failure based on his symptoms. States bad heartburn if he eats certain things. Pt states symptoms about a month. Denies diarrhea, states current constipation. States since he cannot eat, he has lost ~50 lbs in 1 month.

## 2019-08-24 NOTE — ED PROVIDER NOTES
41-year-old male states 3 weeks of central abdominal pain with daily episodes of vomiting. Possible fever and chills. No bowel movement in many days. He has had some cough with some slight white sputum. No particular shortness of breath. Has history of hypertension diabetes and reflux. History of renal insufficiency. Had hernia repair on July 14. Has tried gas pills without improvement. No bleeding. Chest pain or back pain. No particular difficulty urinating    The history is provided by the patient. Abdominal Pain    This is a new problem. The current episode started more than 1 week ago. The problem occurs constantly. The problem has not changed since onset. The pain is associated with vomiting. The pain is located in the generalized abdominal region. The quality of the pain is dull and cramping. The pain is moderate. Associated symptoms include nausea, vomiting and constipation. Pertinent negatives include no fever, no diarrhea, no hematochezia, no melena, no dysuria, no frequency, no hematuria, no headaches, no chest pain and no back pain. The pain is worsened by eating. The pain is relieved by nothing.         Past Medical History:   Diagnosis Date    Arthritis     Chronic pain     arthritic    Colon polyps     Diabetes ([facility] [address] [facility]     Hypertension     Morbid obesity ([facility] [address]     BMI 47    Unspecified sleep apnea     does not require a c-pap       Past Surgical History:   Procedure Laterality Date    HX COLONOSCOPY           Family History:   Problem Relation Age of Onset    Diabetes Mother     Cancer Sister     Stroke Sister     Heart Disease Sister        Social History     Socioeconomic History    Marital status:      Spouse name: Not on file    Number of children: Not on file    Years of education: Not on file    Highest education level: Not on file   Occupational History    Not on file   Social Needs    Financial resource strain: Not on file    Food insecurity:     Worry: Not on file     Inability: Not on file    Transportation needs:     Medical: Not on file     Non-medical: Not on file   Tobacco Use    Smoking status: Never Smoker    Smokeless tobacco: Never Used   Substance and Sexual Activity    Alcohol use: No    Drug use: No    Sexual activity: Not on file   Lifestyle    Physical activity:     Days per week: Not on file     Minutes per session: Not on file    Stress: Not on file   Relationships    Social connections:     Talks on phone: Not on file     Gets together: Not on file     Attends Christian service: Not on file     Active member of club or organization: Not on file     Attends meetings of clubs or organizations: Not on file     Relationship status: Not on file    Intimate partner violence:     Fear of current or ex partner: Not on file     Emotionally abused: Not on file     Physically abused: Not on file     Forced sexual activity: Not on file   Other Topics Concern    Not on file   Social History Narrative    Not on file         ALLERGIES: Pcn [penicillins]    Review of Systems   Constitutional: Negative for chills and fever. Respiratory: Positive for cough. Negative for shortness of breath. Cardiovascular: Negative for chest pain and palpitations. Gastrointestinal: Positive for abdominal pain, constipation, nausea and vomiting. Negative for diarrhea, hematochezia and melena. Genitourinary: Negative for dysuria, flank pain, frequency and hematuria. Musculoskeletal: Negative for back pain and neck pain. Skin: Negative for color change and rash. Neurological: Negative for syncope and headaches. All other systems reviewed and are negative. Vitals:    08/24/19 1114   BP: (!) 161/139   Pulse: (!) 113   Resp: 18   Temp: 98.2 °F (36.8 °C)   SpO2: 99%   Weight: 113.4 kg (250 lb)   Height: 5' 6\" (1.676 m)            Physical Exam   Constitutional: He is oriented to person, place, and time. He appears well-developed and well-nourished.  No distress. HENT:   Head: Normocephalic and atraumatic. Right Ear: External ear normal.   Left Ear: External ear normal.   Mouth/Throat: Oropharynx is clear and moist. No oropharyngeal exudate. Eyes: Pupils are equal, round, and reactive to light. Conjunctivae and EOM are normal.   Neck: Normal range of motion. Neck supple. Cardiovascular: Normal rate, regular rhythm and intact distal pulses. No murmur heard. Pulmonary/Chest: Breath sounds normal. No respiratory distress. Abdominal: Soft. Bowel sounds are normal. He exhibits no mass. There is generalized tenderness. There is no rebound and no guarding. No hernia. Very mild tenderness to abdominal exam.  Slight distention. Neurological: He is alert and oriented to person, place, and time. Gait normal.   Nl speech   Skin: Skin is warm and dry. Psychiatric: He has a normal mood and affect. His speech is normal.   Nursing note and vitals reviewed. MDM  Number of Diagnoses or Management Options  Diagnosis management comments: Imaging to assess for bowel obstruction. Check screening lab work. Blood pressure control. Amount and/or Complexity of Data Reviewed  Clinical lab tests: ordered and reviewed  Tests in the radiology section of CPT®: ordered and reviewed  Tests in the medicine section of CPT®: ordered and reviewed  Review and summarize past medical records: yes (Records reveal patient hospitalized in [facility] August 15-17 from small bowel obstruction discovered on CT scan. Patient left AMA.)  Independent visualization of images, tracings, or specimens: yes (EKG reveals sinus tachycardia 102.   No ST-T changes or ectopy.)    Risk of Complications, Morbidity, and/or Mortality  Presenting problems: moderate  Diagnostic procedures: low  Management options: moderate    Patient Progress  Patient progress: stable         Procedures    Results Include:    Recent Results (from the past 24 hour(s))   CBC WITH AUTOMATED DIFF    Collection Time: 08/24/19 11:25 AM   Result Value Ref Range    WBC 9.1 4.3 - 11.1 K/uL    RBC 3.67 (L) 4.23 - 5.6 M/uL    HGB 11.0 (L) 13.6 - 17.2 g/dL    HCT 34.5 (L) 41.1 - 50.3 %    MCV 94.0 79.6 - 97.8 FL    MCH 30.0 26.1 - 32.9 PG    MCHC 31.9 31.4 - 35.0 g/dL    RDW 15.1 (H) 11.9 - 14.6 %    PLATELET 332 (H) 243 - 450 K/uL    MPV 11.8 9.4 - 12.3 FL    ABSOLUTE NRBC 0.00 0.0 - 0.2 K/uL    DF AUTOMATED      NEUTROPHILS 68 43 - 78 %    LYMPHOCYTES 20 13 - 44 %    MONOCYTES 10 4.0 - 12.0 %    EOSINOPHILS 1 0.5 - 7.8 %    BASOPHILS 0 0.0 - 2.0 %    IMMATURE GRANULOCYTES 1 0.0 - 5.0 %    ABS. NEUTROPHILS 6.2 1.7 - 8.2 K/UL    ABS. LYMPHOCYTES 1.8 0.5 - 4.6 K/UL    ABS. MONOCYTES 0.9 0.1 - 1.3 K/UL    ABS. EOSINOPHILS 0.1 0.0 - 0.8 K/UL    ABS. BASOPHILS 0.0 0.0 - 0.2 K/UL    ABS. IMM. GRANS. 0.1 0.0 - 0.5 K/UL   METABOLIC PANEL, COMPREHENSIVE    Collection Time: 08/24/19 11:25 AM   Result Value Ref Range    Sodium 135 (L) 136 - 145 mmol/L    Potassium 4.4 3.5 - 5.1 mmol/L    Chloride 99 98 - 107 mmol/L    CO2 25 21 - 32 mmol/L    Anion gap 11 7 - 16 mmol/L    Glucose 119 (H) 65 - 100 mg/dL    BUN 72 (H) 8 - 23 MG/DL    Creatinine 2.87 (H) 0.8 - 1.5 MG/DL    GFR est AA 29 (L) >60 ml/min/1.73m2    GFR est non-AA 24 (L) >60 ml/min/1.73m2    Calcium 9.2 8.3 - 10.4 MG/DL    Bilirubin, total 0.6 0.2 - 1.1 MG/DL    ALT (SGPT) 23 12 - 65 U/L    AST (SGOT) 30 15 - 37 U/L    Alk.  phosphatase 158 (H) 50 - 136 U/L    Protein, total 8.2 6.3 - 8.2 g/dL    Albumin 3.2 3.2 - 4.6 g/dL    Globulin 5.0 (H) 2.3 - 3.5 g/dL    A-G Ratio 0.6 (L) 1.2 - 3.5     LIPASE    Collection Time: 08/24/19 11:25 AM   Result Value Ref Range    Lipase 508 (H) 73 - 393 U/L   BNP    Collection Time: 08/24/19 11:25 AM   Result Value Ref Range    BNP 38 (H) 0 pg/mL   POC TROPONIN-I    Collection Time: 08/24/19 11:29 AM   Result Value Ref Range    Troponin-I (POC) 0.01 (L) 0.02 - 0.05 ng/ml   EKG, 12 LEAD, INITIAL    Collection Time: 08/24/19 11:35 AM   Result Value Ref Range    Ventricular Rate 102 BPM    Atrial Rate 106 BPM    P-R Interval 136 ms    QRS Duration 88 ms    Q-T Interval 356 ms    QTC Calculation (Bezet) 463 ms    Calculated P Axis 31 degrees    Calculated R Axis -3 degrees    Calculated T Axis 20 degrees    Diagnosis       !! AGE AND GENDER SPECIFIC ECG ANALYSIS !! Sinus tachycardia  Minimal voltage criteria for LVH, may be normal variant  Borderline ECG  No previous ECGs available       Xr Chest Port    Result Date: 8/24/2019  History: Vomiting, chest pain Exam: portable chest Comparison: None Findings: The lungs are clear. The mediastinal contour and osseous structures are normal. Impressions: No acute findings. Ct Abd Pelv Wo Cont    Result Date: 8/24/2019  History: Abdominal pain, generalized, with vomiting. Cough. 50 pound weight loss in one month. EXAM: CT abdomen and pelvis with oral contrast only TECHNIQUE: Thin section axial CT images are obtained from the lung bases through the pubic symphysis. Radiation dose reduction techniques were used for this study. Our CT scanners use one or all of the following: Automated exposure control, adjustment of the mA and/or kV according to patient size, use of iterative reconstruction. No comparison FINDINGS: There is minimal bibasilar atelectasis. CT ABDOMEN: No contour deforming abnormality of the liver. Multiple low-density lesions seen within the spleen. Gallstones present in the gallbladder. The adrenal glands and pancreas are normal. No renal or ureteral calculi demonstrated. There are multiple dilated loops of small bowel measuring up to 6 cm. It is difficult to identify a zone of transition, but multiple decompressed loops of small bowel are present in the right lower quadrant, and there is colonic decompression present as well. There is a fluid-filled supraumbilical abdominal wall hernia present, midline. CT PELVIS: The bladder and rectum are normal. No pelvic adenopathy demonstrated.  Small pelvic free fluid present. Bone window evaluation demonstrates no aggressive osseous lesions. IMPRESSION: 1. Findings compatible with small bowel obstruction with multiple dilated loops of small bowel seen within the upper abdomen. It is difficult to identify a zone of transition, but multiple decompressed loops of small bowel are present in the right lower quadrant. 2. Fluid-containing supraumbilical ventral abdominal wall hernia. 3. Cholelithiasis. 4. Multiple low-density lesions within the spleen which cannot be further characterized on this exam. Correlation with targeted sonography, as an outpatient on a nonemergent basis, recommended for further evaluation. Xr Chest Port    Result Date: 8/24/2019  History: Vomiting, chest pain Exam: portable chest Comparison: None Findings: The lungs are clear. The mediastinal contour and osseous structures are normal. Impressions: No acute findings. Discussed with surgery for consultation. Discussed with hospitalist regarding admission.

## 2019-08-24 NOTE — PROGRESS NOTES
08/24/19 1715   Dual Skin Pressure Injury Assessment   Dual Skin Pressure Injury Assessment WDL   Old surgical sites, alleyvn in place

## 2019-08-24 NOTE — ROUTINE PROCESS
TRANSFER - OUT REPORT:    Verbal report given to [address] [place] [address] Road, RN on Rola Benitez  being transferred to [facility] for routine progression of care       Report consisted of patients Situation, Background, Assessment and   Recommendations(SBAR). Information from the following report(s) SBAR was reviewed with the receiving nurse. Lines:   Peripheral IV 08/24/19 Left Antecubital (Active)        Opportunity for questions and clarification was provided.       Patient transported with:   A[address]

## 2019-08-25 LAB
ANION GAP SERPL CALC-SCNC: 11 MMOL/L (ref 7–16)
APPEARANCE UR: CLEAR
BACTERIA URNS QL MICRO: 0 /HPF
BASOPHILS # BLD: 0 K/UL (ref 0–0.2)
BASOPHILS NFR BLD: 0 % (ref 0–2)
BILIRUB UR QL: ABNORMAL
BUN SERPL-MCNC: 73 MG/DL (ref 8–23)
CALCIUM SERPL-MCNC: 8.9 MG/DL (ref 8.3–10.4)
CASTS URNS QL MICRO: 0 /LPF
CHLORIDE SERPL-SCNC: 101 MMOL/L (ref 98–107)
CO2 SERPL-SCNC: 26 MMOL/L (ref 21–32)
COLOR UR: YELLOW
CREAT SERPL-MCNC: 2.69 MG/DL (ref 0.8–1.5)
CRYSTALS URNS QL MICRO: 0 /LPF
DIFFERENTIAL METHOD BLD: ABNORMAL
EOSINOPHIL # BLD: 0.1 K/UL (ref 0–0.8)
EOSINOPHIL NFR BLD: 2 % (ref 0.5–7.8)
EPI CELLS #/AREA URNS HPF: NORMAL /HPF
ERYTHROCYTE [DISTWIDTH] IN BLOOD BY AUTOMATED COUNT: 15.1 % (ref 11.9–14.6)
GLUCOSE BLD STRIP.AUTO-MCNC: 68 MG/DL (ref 65–100)
GLUCOSE BLD STRIP.AUTO-MCNC: 84 MG/DL (ref 65–100)
GLUCOSE BLD STRIP.AUTO-MCNC: 86 MG/DL (ref 65–100)
GLUCOSE BLD STRIP.AUTO-MCNC: 95 MG/DL (ref 65–100)
GLUCOSE SERPL-MCNC: 80 MG/DL (ref 65–100)
GLUCOSE UR STRIP.AUTO-MCNC: NEGATIVE MG/DL
HCT VFR BLD AUTO: 32 % (ref 41.1–50.3)
HGB BLD-MCNC: 10 G/DL (ref 13.6–17.2)
HGB UR QL STRIP: NEGATIVE
IMM GRANULOCYTES # BLD AUTO: 0 K/UL (ref 0–0.5)
IMM GRANULOCYTES NFR BLD AUTO: 1 % (ref 0–5)
KETONES UR QL STRIP.AUTO: ABNORMAL MG/DL
LEUKOCYTE ESTERASE UR QL STRIP.AUTO: NEGATIVE
LIPASE SERPL-CCNC: 445 U/L (ref 73–393)
LYMPHOCYTES # BLD: 1.7 K/UL (ref 0.5–4.6)
LYMPHOCYTES NFR BLD: 26 % (ref 13–44)
MAGNESIUM SERPL-MCNC: 2 MG/DL (ref 1.8–2.4)
MCH RBC QN AUTO: 29.5 PG (ref 26.1–32.9)
MCHC RBC AUTO-ENTMCNC: 31.3 G/DL (ref 31.4–35)
MCV RBC AUTO: 94.4 FL (ref 79.6–97.8)
MM INDURATION POC: 0 MM (ref 0–5)
MONOCYTES # BLD: 1.2 K/UL (ref 0.1–1.3)
MONOCYTES NFR BLD: 17 % (ref 4–12)
MUCOUS THREADS URNS QL MICRO: 0 /LPF
NEUTS SEG # BLD: 3.7 K/UL (ref 1.7–8.2)
NEUTS SEG NFR BLD: 55 % (ref 43–78)
NITRITE UR QL STRIP.AUTO: NEGATIVE
NRBC # BLD: 0 K/UL (ref 0–0.2)
PH UR STRIP: 6 [PH] (ref 5–9)
PHOSPHATE SERPL-MCNC: 4.3 MG/DL (ref 2.3–3.7)
PLATELET # BLD AUTO: 408 K/UL (ref 150–450)
PMV BLD AUTO: 12.1 FL (ref 9.4–12.3)
POTASSIUM SERPL-SCNC: 3.9 MMOL/L (ref 3.5–5.1)
PPD POC: NEGATIVE NEGATIVE
PROT UR STRIP-MCNC: 100 MG/DL
RBC # BLD AUTO: 3.39 M/UL (ref 4.23–5.6)
RBC #/AREA URNS HPF: NORMAL /HPF
SODIUM SERPL-SCNC: 138 MMOL/L (ref 136–145)
SP GR UR REFRACTOMETRY: 1.01 (ref 1–1.02)
TRIGL SERPL-MCNC: 177 MG/DL (ref 35–150)
UROBILINOGEN UR QL STRIP.AUTO: 0.2 EU/DL (ref 0.2–1)
WBC # BLD AUTO: 6.7 K/UL (ref 4.3–11.1)
WBC URNS QL MICRO: NORMAL /HPF

## 2019-08-25 PROCEDURE — 77030020255 HC SOL INJ LR 1000ML BG

## 2019-08-25 PROCEDURE — 84100 ASSAY OF PHOSPHORUS: CPT

## 2019-08-25 PROCEDURE — 74011000258 HC RX REV CODE- 258: Performed by: NURSE PRACTITIONER

## 2019-08-25 PROCEDURE — 81001 URINALYSIS AUTO W/SCOPE: CPT

## 2019-08-25 PROCEDURE — 84478 ASSAY OF TRIGLYCERIDES: CPT

## 2019-08-25 PROCEDURE — 81015 MICROSCOPIC EXAM OF URINE: CPT

## 2019-08-25 PROCEDURE — C9113 INJ PANTOPRAZOLE SODIUM, VIA: HCPCS | Performed by: NURSE PRACTITIONER

## 2019-08-25 PROCEDURE — 36415 COLL VENOUS BLD VENIPUNCTURE: CPT

## 2019-08-25 PROCEDURE — 83690 ASSAY OF LIPASE: CPT

## 2019-08-25 PROCEDURE — 74011250636 HC RX REV CODE- 250/636: Performed by: NURSE PRACTITIONER

## 2019-08-25 PROCEDURE — 74011000250 HC RX REV CODE- 250: Performed by: NURSE PRACTITIONER

## 2019-08-25 PROCEDURE — 82962 GLUCOSE BLOOD TEST: CPT

## 2019-08-25 PROCEDURE — 74011250636 HC RX REV CODE- 250/636: Performed by: SURGERY

## 2019-08-25 PROCEDURE — 80048 BASIC METABOLIC PNL TOTAL CA: CPT

## 2019-08-25 PROCEDURE — 85025 COMPLETE CBC W/AUTO DIFF WBC: CPT

## 2019-08-25 PROCEDURE — 87086 URINE CULTURE/COLONY COUNT: CPT

## 2019-08-25 PROCEDURE — 83735 ASSAY OF MAGNESIUM: CPT

## 2019-08-25 PROCEDURE — 3E0336Z INTRODUCTION OF NUTRITIONAL SUBSTANCE INTO PERIPHERAL VEIN, PERCUTANEOUS APPROACH: ICD-10-PCS | Performed by: NURSE PRACTITIONER

## 2019-08-25 PROCEDURE — 97535 SELF CARE MNGMENT TRAINING: CPT

## 2019-08-25 PROCEDURE — 97166 OT EVAL MOD COMPLEX 45 MIN: CPT

## 2019-08-25 PROCEDURE — 97530 THERAPEUTIC ACTIVITIES: CPT

## 2019-08-25 PROCEDURE — 65270000029 HC RM PRIVATE

## 2019-08-25 RX ORDER — SODIUM CHLORIDE, SODIUM LACTATE, POTASSIUM CHLORIDE, CALCIUM CHLORIDE 600; 310; 30; 20 MG/100ML; MG/100ML; MG/100ML; MG/100ML
50 INJECTION, SOLUTION INTRAVENOUS CONTINUOUS
Status: DISCONTINUED | OUTPATIENT
Start: 2019-08-25 | End: 2019-08-26

## 2019-08-25 RX ORDER — SODIUM CHLORIDE, SODIUM LACTATE, POTASSIUM CHLORIDE, CALCIUM CHLORIDE 600; 310; 30; 20 MG/100ML; MG/100ML; MG/100ML; MG/100ML
150 INJECTION, SOLUTION INTRAVENOUS CONTINUOUS
Status: DISPENSED | OUTPATIENT
Start: 2019-08-25 | End: 2019-08-25

## 2019-08-25 RX ADMIN — SODIUM CHLORIDE 40 MG: 9 INJECTION INTRAMUSCULAR; INTRAVENOUS; SUBCUTANEOUS at 09:00

## 2019-08-25 RX ADMIN — HEPARIN SODIUM 5000 UNITS: 5000 INJECTION INTRAVENOUS; SUBCUTANEOUS at 10:25

## 2019-08-25 RX ADMIN — POTASSIUM CHLORIDE: 2 INJECTION, SOLUTION, CONCENTRATE INTRAVENOUS at 18:25

## 2019-08-25 RX ADMIN — SODIUM CHLORIDE, SODIUM LACTATE, POTASSIUM CHLORIDE, AND CALCIUM CHLORIDE 150 ML/HR: 600; 310; 30; 20 INJECTION, SOLUTION INTRAVENOUS at 09:07

## 2019-08-25 RX ADMIN — DIPHENHYDRAMINE HYDROCHLORIDE 12.5 MG: 50 INJECTION, SOLUTION INTRAMUSCULAR; INTRAVENOUS at 05:53

## 2019-08-25 RX ADMIN — HEPARIN SODIUM 5000 UNITS: 5000 INJECTION INTRAVENOUS; SUBCUTANEOUS at 18:27

## 2019-08-25 RX ADMIN — HEPARIN SODIUM 5000 UNITS: 5000 INJECTION INTRAVENOUS; SUBCUTANEOUS at 02:35

## 2019-08-25 RX ADMIN — SODIUM CHLORIDE, SODIUM LACTATE, POTASSIUM CHLORIDE, AND CALCIUM CHLORIDE 150 ML/HR: 600; 310; 30; 20 INJECTION, SOLUTION INTRAVENOUS at 12:00

## 2019-08-25 RX ADMIN — SOYBEAN OIL 250 ML: 20 INJECTION, SOLUTION INTRAVENOUS at 18:25

## 2019-08-25 NOTE — PROGRESS NOTES
H&P/Consult Note/Progress Note/Office Note:   Mindy Holstein  MRN: [id number]  :1955  Age:64 y.o.    HPI: Mindy Holstein is a 71 y.o. male with h/o CHF, obesity, DM, CKD who recently had surgery at [facility].    19 s/p robotic assisted 8cm ventral hernia repair with 4.5cm x 11.4cm Ventralight STmesh at [facility]   (and benign lipoma resection from back) by Dr Jhon Ames ([phone])   DIAGNOSIS: BENIGN FATTY TISSUE, CONSISTENT WITH LIPOMA. Felipe Zuniga M.D.  (Electronically Signed)  He describes having a seroma aspirated by Dr Silvana Wong post-op. 8/15/19 He was admitted to [facility] with recurrent hernia and SBO and followed by surgery there with NGT  He did not have surgery and was discharged after approx 2 days    8/15/19 CT abd/pelvis at [facility]  IMPRESSION:  [address]UM[address], SEEN ON THE PREVIOUS STUDY OF 2019, WITHOUT  BOWEL CONTENT BUT WITH SIGNIFICANT PERITONEAL FAT CONTENT, POSSIBLY RELATED TO  THE SMALL BOWEL OBSTRUCTION    INCIDENTALLY OBSERVED IS UNCOMPLICATED CHOLELITHIASIS    MODERATE BILATERAL RENAL ATROPHY      He came to [facility] ER on 19 with progressive, constant, severe N/V/abd for >1 week. Abd pain involved upper abdomen and was generalized and non-radiating  CT is shown below with recurrent SBO  Nothing made symptoms better or worse. No associated fevers  He was admitted by Hospitalists  Gen Surgery was consulted by Dr Shagufta Patel. Gaston Head who called our service. NGT placed and palcement confirmed on abd Xray        19 CT abd/pelvis with oral but no IV contrast  Hx: Abd pain, generalized, with vomiting. Cough. 50 pound weight loss in 1month      FINDINGS: There is minimal bibasilar atelectasis.     CT ABD: No contour deforming abnormality of the liver. Multiple low-density  lesions seen within the spleen. Gallstones present in the gallbladder.  The  adrenal glands and pancreas are normal. No renal or ureteral calculi  demonstrated. There are multiple dilated loops of small bowel measuring up to 6  cm. It is difficult to identify a zone of transition, but multiple decompressed  loops of small bowel are present in the right lower quadrant, and there is  colonic decompression present as well. There is a fluid-filled supraumbilical  abdominal wall hernia present, midline.     CT PELVIS: The bladder and rectum are normal. No pelvic adenopathy demonstrated. Small pelvic free fluid present.     Bone window evaluation demonstrates no aggressive osseous lesions.       IMPRESSION:  1. Findings compatible with small bowel obstruction with multiple dilated loops  of small bowel seen within the upper abdomen. It is difficult to identify a zone  of transition, but multiple decompressed loops of small bowel are present in the  right lower quadrant. 2. Fluid-containing supraumbilical ventral abdominal wall hernia. 3. Cholelithiasis. 4. Multiple low-density lesions within the spleen which cannot be further  characterized on this exam. Correlation with targeted sonography, as an  outpatient on a nonemergent basis, recommended for further evaluation      He previously had colonoscopy at [facility] as shown below. 10/25/18 s/p colonoscopy at [facility]; Dr Chris Crenshaw MD--->  \"Normal\" ; no specimens; next planned in 5yrs      Additional hx:  8/25/19 no new issues; sleeping      Past Medical History:   Diagnosis Date    Arthritis     Chronic pain     arthritic    CKD (chronic kidney disease) 2018    BASELINE CREATININE IN LOW 2'S    Colon polyps 2018    Hernia of abdominal cavity 55/91/7563    POST OP UMBILICAL HERNIA 4/47 WITH RESULTANT VENTRAL HERNIA, NON INCARCERATED.  Hypertension     ON MEDS    Morbid obesity ([address]     BMI 47    Non-insulin dependent type 2 diabetes mellitus ([address]     WAS TOLD HE DID NOT HAVE DM ANY MORE IN [place] OF 2019.   UNKNOWN A1 C    Unintentional weight loss 8/24/2019    50# POST OP July-AUGUST 2019    Unspecified sleep apnea     does not require a c-pap    Ventral hernia JULY 2019 POST OP     Past Surgical History:   Procedure Laterality Date    HX COLONOSCOPY      HX HERNIA REPAIR  07/2019     Current Facility-Administered Medications   Medication Dose Route Frequency    lactated Ringers infusion  150 mL/hr IntraVENous CONTINUOUS    naloxone (NARCAN) injection 0.4 mg  0.4 mg IntraVENous PRN    ondansetron (ZOFRAN) injection 4 mg  4 mg IntraVENous Q4H PRN    promethazine (PHENERGAN) with saline injection 12.5 mg  12.5 mg IntraVENous Q4H PRN    diphenhydrAMINE (BENADRYL) injection 12.5 mg  12.5 mg IntraVENous Q4H PRN    LORazepam (ATIVAN) injection 1 mg  1 mg IntraVENous Q6H PRN    heparin (porcine) injection 5,000 Units  5,000 Units SubCUTAneous Q8H    insulin lispro (HUMALOG) injection   SubCUTAneous AC&HS    tuberculin injection 5 Units  5 Units IntraDERMal ONCE    pantoprazole (PROTONIX) 40 mg in sodium chloride 0.9% 10 mL injection  40 mg IntraVENous DAILY    hydrALAZINE (APRESOLINE) 20 mg/mL injection 10 mg  10 mg IntraVENous Q6H PRN    morphine injection 8 mg  8 mg IntraVENous Q4H PRN     Pcn [penicillins]  Social History     Socioeconomic History    Marital status:      Spouse name: Not on file    Number of children: Not on file    Years of education: Not on file    Highest education level: Not on file   Tobacco Use    Smoking status: Never Smoker    Smokeless tobacco: Never Used   Substance and Sexual Activity    Alcohol use: No    Drug use: No   Social History Narrative    8/24:  PATIENT IS RETIRED, LIVES WITH WIFE PHAM. ([phone]). HE DOES NOT HAVE ANY CHILDREN. Social History     Tobacco Use   Smoking Status Never Smoker   Smokeless Tobacco Never Used     Family History   Problem Relation Age of Onset    Diabetes Mother     Cancer Sister     Stroke Sister     Heart Disease Sister      ROS: The patient has no difficulty with chest pain or shortness of breath. No fever or chills. Comprehensive review of systems was otherwise unremarkable except as noted above. Physical Exam:   Visit Vitals  BP (!) 152/91 (BP 1 Location: Right arm, BP Patient Position: At rest)   Pulse (!) 104   Temp 97.9 °F (36.6 °C)   Resp 20   Ht 5' 6\" (1.676 m)   Wt 250 lb (113.4 kg)   SpO2 96%   BMI 40.35 kg/m²     Vitals:    08/24/19 1629 08/24/19 1722 08/24/19 1917 08/24/19 2354   BP: 182/83 145/89 151/83 (!) 152/91   Pulse: 96 (!) 110 (!) 103 (!) 104   Resp:  19 20 20   Temp:  98.2 °F (36.8 °C) 98.1 °F (36.7 °C) 97.9 °F (36.6 °C)   SpO2: 97% 98% 97% 96%   Weight:       Height:         08/24 1901 - 08/25 0700  In: -   Out: 325 [Urine:325]  No intake/output data recorded. Constitutional: Alert, oriented, cooperative patient in no acute distress; appears stated age    Eyes:Sclera are clear. EOMs intact  ENMT: no external lesions gross hearing normal; no obvious neck masses, no ear or lip lesions, nares normal; +NGT  CV: RRR. Normal perfusion  Resp: No JVD. Breathing is  non-labored; no audible wheezing. GI: obese, healed trochar sites; cannot palpate hernia but his obesity limits exam; Upper abdomen is tender; No guarding or rebound      Musculoskeletal: unremarkable with normal function. No embolic signs or cyanosis.    Neuro:  Oriented; moves all 4; no focal deficits  Psychiatric: normal affect and mood, no memory impairment    Recent vitals (if inpt):  Patient Vitals for the past 24 hrs:   BP Temp Pulse Resp SpO2 Height Weight   08/24/19 2354 (!) 152/91 97.9 °F (36.6 °C) (!) 104 20 96 %     08/24/19 1917 151/83 98.1 °F (36.7 °C) (!) 103 20 97 %     08/24/19 1722 145/89 98.2 °F (36.8 °C) (!) 110 19 98 %     08/24/19 1629 182/83  96  97 %     08/24/19 1559 154/85    98 %     08/24/19 1541     100 %     08/24/19 1540     100 %     08/24/19 1531     98 %     08/24/19 1530 153/84    98 %     08/24/19 1330    16 98 %     08/24/19 1329 155/83   12 98 %     08/24/19 1314 142/71    98 %     08/24/19 1259 126/56    98 %     08/24/19 1244 122/56    99 %     08/24/19 1229 138/60    99 %     08/24/19 1149 (!) 163/104  97       08/24/19 1114 (!) 161/139 98.2 °F (36.8 °C) (!) 113 18 99 % 5' 6\" (1.676 m) 250 lb (113.4 kg)       Labs:  Recent Labs     08/24/19  1724 08/24/19  1125   WBC  --  9.1   HGB  --  11.0*   PLT  --  522*   NA  --  135*   K  --  4.4   CL  --  99   CO2  --  25   BUN  --  72*   CREA  --  2.87*   GLU  --  119*   PTP 13.2  --    INR 1.0  --    TBILI  --  0.6   SGOT  --  30   ALT  --  23   AP  --  158*   LPSE  --  508*       Lab Results   Component Value Date/Time    WBC 9.1 08/24/2019 11:25 AM    HGB 11.0 (L) 08/24/2019 11:25 AM    PLATELET 927 (H) 35/55/0599 11:25 AM    Sodium 135 (L) 08/24/2019 11:25 AM    Potassium 4.4 08/24/2019 11:25 AM    Chloride 99 08/24/2019 11:25 AM    CO2 25 08/24/2019 11:25 AM    BUN 72 (H) 08/24/2019 11:25 AM    Creatinine 2.87 (H) 08/24/2019 11:25 AM    Glucose 119 (H) 08/24/2019 11:25 AM    INR 1.0 08/24/2019 05:24 PM    Bilirubin, total 0.6 08/24/2019 11:25 AM    AST (SGOT) 30 08/24/2019 11:25 AM    ALT (SGPT) 23 08/24/2019 11:25 AM    Alk. phosphatase 158 (H) 08/24/2019 11:25 AM    Lipase 508 (H) 08/24/2019 11:25 AM       CT Results  (Last 48 hours)               08/24/19 1403  CT ABD PELV WO CONT Final result    Impression:  IMPRESSION:   1. Findings compatible with small bowel obstruction with multiple dilated loops   of small bowel seen within the upper abdomen. It is difficult to identify a zone   of transition, but multiple decompressed loops of small bowel are present in the   right lower quadrant. 2. Fluid-containing supraumbilical ventral abdominal wall hernia. 3. Cholelithiasis.    4. Multiple low-density lesions within the spleen which cannot be further   characterized on this exam. Correlation with targeted sonography, as an   outpatient on a nonemergent basis, recommended for further evaluation. Narrative:  History: Abdominal pain, generalized, with vomiting. Cough. 50 pound weight loss   in one month. EXAM: CT abdomen and pelvis with oral contrast only       TECHNIQUE: Thin section axial CT images are obtained from the lung bases through   the pubic symphysis. Radiation dose reduction techniques were used for this   study. Our CT scanners use one or all of the following: Automated exposure   control, adjustment of the mA and/or kV according to patient size, use of   iterative reconstruction. No comparison       FINDINGS: There is minimal bibasilar atelectasis. CT ABDOMEN: No contour deforming abnormality of the liver. Multiple low-density   lesions seen within the spleen. Gallstones present in the gallbladder. The   adrenal glands and pancreas are normal. No renal or ureteral calculi   demonstrated. There are multiple dilated loops of small bowel measuring up to 6   cm. It is difficult to identify a zone of transition, but multiple decompressed   loops of small bowel are present in the right lower quadrant, and there is   colonic decompression present as well. There is a fluid-filled supraumbilical   abdominal wall hernia present, midline. CT PELVIS: The bladder and rectum are normal. No pelvic adenopathy demonstrated. Small pelvic free fluid present. Bone window evaluation demonstrates no aggressive osseous lesions. chest X-ray      I reviewed recent labs, recent radiologic studies, and pertinent records including other doctor notes if needed. I independently reviewed radiology images for studies I described above or studies I have ordered.    Admission date (for inpatients): 8/24/2019   * No surgery found *  * No surgery found *    ASSESSMENT/PLAN:  Problem List  Date Reviewed: 8/24/2019          Codes Class Noted    * (Principal) Small bowel obstruction ([facility] ICD-10-CM: G81.495  ICD-9-CM: 560.9  8/24/2019        Unspecified sleep apnea (Chronic) ICD-10-CM: G47.30  ICD-9-CM: 780.57  8/24/2019    Overview Signed 8/24/2019  4:31 PM by Jose Collier NP     does not require a c-pap             Morbid obesity ([facility] (Chronic) ICD-10-CM: E66.01  ICD-9-CM: 278.01  8/24/2019    Overview Signed 8/24/2019  4:32 PM by Jose Collier NP     BMI 54  HAS LOST 50# POST OP July 2019 UNTIL AUGUST 24, 2019             Unintentional weight loss ICD-10-CM: R63.4  ICD-9-CM: 783.21  8/24/2019    Overview Signed 8/24/2019  4:33 PM by Jose Collier NP     50# POST OP July-AUGUST 2019             Hypertension (Chronic) ICD-10-CM: I10  ICD-9-CM: 401.9  8/24/2019        Diabetes ([facility] [address] (Chronic) ICD-10-CM: E11.9  ICD-9-CM: 250.00  8/24/2019    Overview Signed 8/24/2019  4:34 PM by Jose Collier NP     7/2019:  PATIENT INFORMED HE DOES NOT HAVE DIABETES ANY MORE. GLUCOTROL AND METFORMIN DISCONTINUED. Hernia of abdominal cavity (Chronic) ICD-10-CM: K46.9  ICD-9-CM: 553.9  8/24/2019    Overview Signed 8/24/2019  4:35 PM by Jose Collier NP     POST OP UMBILICAL HERNIA 0/29 WITH RESULTANT VENTRAL HERNIA, NON INCARCERATED. Diarrhea ICD-10-CM: R19.7  ICD-9-CM: 787.91  8/24/2019    Overview Signed 8/24/2019  4:36 PM by Jose Collier NP     INTERMITTENT SINCE SURGERY 7/2019             Weakness ICD-10-CM: R53.1  ICD-9-CM: 780.79  8/24/2019    Overview Signed 8/24/2019  4:36 PM by Jose Collier NP     ARMS AND LEGS MOSTLY.               Acute renal failure superimposed on stage 4 chronic kidney disease ([facility] ICD-10-CM: N17.9, N18.4  ICD-9-CM: 584.9, 585.4  8/24/2019    Overview Signed 8/24/2019  4:37 PM by Jose Collier NP     [facility] 2'[address]  NEPHROLOGY:  YANIV LOYOLA              Intractable nausea and vomiting ICD-10-CM: R11.2  ICD-9-CM: 536.2  8/24/2019    Overview Signed 8/24/2019  4:37 PM by Jose Collier NP     INTERMITTENTLY [facility] SURGERY July 2019 Abdominal pain (Chronic) ICD-10-CM: R10.9  ICD-9-CM: 789.00  8/24/2019        Constipation ICD-10-CM: K59.00  ICD-9-CM: 564.00  8/24/2019        Anemia ICD-10-CM: D64.9  ICD-9-CM: 285.9  8/24/2019        Elevated lipase ICD-10-CM: R74.8  ICD-9-CM: 790.5  8/24/2019        Recurrent ventral hernia ICD-10-CM: K43.2  ICD-9-CM: 553.21  8/24/2019            Principal Problem:    Small bowel obstruction ([facility] (8/24/2019)    Active Problems:    Unspecified sleep apnea (8/24/2019)      Overview: does not require a c-pap      Morbid obesity ([facility] (8/24/2019)      Overview: BMI 54      HAS LOST 50# POST OP July 2019 UNTIL AUGUST 24, 2019      Unintentional weight loss (8/24/2019)      Overview: 50# POST OP July-AUGUST 2019      Hypertension (8/24/2019)      Diabetes ([facility] (8/24/2019)      Overview: 7/2019:  PATIENT INFORMED HE DOES NOT HAVE DIABETES ANY MORE. GLUCOTROL       AND METFORMIN DISCONTINUED. Hernia of abdominal cavity (8/24/2019)      Overview: POST OP UMBILICAL HERNIA 1/02 WITH RESULTANT VENTRAL HERNIA, NON       INCARCERATED. Diarrhea (8/24/2019)      Overview: INTERMITTENT SINCE SURGERY 7/2019      Weakness (8/24/2019)      Overview: ARMS AND LEGS MOSTLY.        Acute renal failure superimposed on stage 4 chronic kidney disease ([facility] (8/24/2019)      Overview: BASELINE CREATININE IN [facility] 2'S      NEPHROLOGY:  YAN[facility] IN [place]       Intractable nausea and vomiting (8/24/2019)      Overview: INTERMITTENTLY SINCE SURGERY July 2019      Abdominal pain (8/24/2019)      Constipation (8/24/2019)      Anemia (8/24/2019)      Elevated lipase (8/24/2019)      Recurrent ventral hernia (8/24/2019)         Recurrent SBO  Recent robotic ventral hernia surgery with large piece of mesh at Med by Dr Silvana Wong  He may have a mesh complication with bowel involvement  I told pt that I do not perform robotic ventral hernia repair  NPO/IVF  Bowel rest  NGT-->LISx    Possible hernia recurrence vs post-op seroma on CT  Suspect this is only a seroma and not a recurrent hernia based on hx and images  Follow    Renal Insufficiency  Unsure of baseline as we have no old labs  He thinks baseline Cr approx 2.5  Hydrate   Daily labs    Morbid obesity  Encourage weight loss              I have personally performed a face-to-face diagnostic evaluation and management  service on this patient. I have independently seen the patient. I have independently obtained the above history from the patient/family. I have independently examined the patient with above findings. I have independently reviewed data/labs for this patient and developed the above plan of care (MDM). Signed: Venu Montemayor.  Ham Mckee MD, FACS

## 2019-08-25 NOTE — PROGRESS NOTES
Nutrition  Reason for assessment: Consult for TPN management per nutritional support protocols. \"PATIENT HAS HAD ILEUS/SBO SINCE SURGERY IN July. 50# REPORTED WEIGHT LOSS. \" (Stanton Soulier, NP)  Assessment:   Diet: DIET NPO    Food/Nutrition History: Pt seen in company of wife at bedside. Pt reports a UBW of ~308 pounds prior to ventral hernia repair (7/12/19). According to notes, pt presented with recurrent hernia and SBO on 8/15 and is now admitted with recurrent SBO, n/v x 1 week (or greater) PTA. Pt states that prior to surgery in July he was eating 1 \"big evening meal\" per day and would snack throughout the day. He states that he ate fair following surgery in July but wife at bedside states that he has been vomiting following each sip of fluid or bite of food since July. NGT to LIS. Abdomen obese with hypoactive bowel sounds. Date of last BM: 8/21 (per pt). Central line access : needs central line access. Pt has one peripheral line. Pertinent Medications: LR @ 150ml/hr, protonix, SSI  Pertinent Labs: Phos 4.3, Glucose 80, Triglyceride 177, lipase 445, BUN 63, Cr 2.69  POC Glucoses:  84 mg/dl; H/O DM, A1C 5.4 (8/24/19)  Anthropometrics:Height: 5' 6\" (167.6 cm), wt source unstated  , Weight: 113.4 kg (250 lb), Body mass index is 40.35 kg/m². Rambo Bhardwaj BMI class of morbid obesity class III. WT / BMI 8/24/2019 8/25/2015 8/24/2015 6/18/2013   WEIGHT 250 lb 350 lb 350 lb 360 lb   No recent weights in EMR since 2015. Potential 100 pound weight loss since 2015 according to listed weights.    Macronutrient needs:(IBW 64.5 kg)  EER:  2277-2041 kcal /day (20-25 kcal/kg I BW)  EPR:  52-65 grams protein/day (0.8-1 grams/kg IBW)  Max CHO:  372 grams/day (4mg/kg IBW/min)  Fluid:  1ml/kcal - or per MD  Intake/Comparative Standards: Current NPO status does not meet kcal or protein needs    Nutrition Diagnosis: Inadequate energy intake r/t altered GI function as evidenced by post-op ventral hernia repair SBO, n/v x 1 week (or greater) PTA, and NPO meeting 0% of needs. Intervention:   Meals and snacks: NPO  Nutritional Supplement Therapy: Electrolyte replacement per nutritional support protocols are active on MAR. PN/IVF:   1. Start PPN via peripheral line: 5%DEX/ 4.25%AA at 85 ml/hr with 250 ml 20% Lipids daily provides  1180 kcal/d (91% of needs), 85 grams of protein/d (100% of needs, ~1.3 gm PRO/kgIBW), 100 grams of CHO/d (does not exceed maximum CHO load) and 2250 ml of total volume/d (100% of needs). 2. Additives: 70 meq NaCl, 20 meq KCl, 5 meq Mg, calcium, MVI/trace elements  3. MOsmol/L is < 900.   4. Adjust LR to 50 ml/hr to continue to provide ~150 ml/hr of fluids with IVF + TPN + lipids  5. Add TPN labs, POC Glucoses/SSI if Glucose > 180 mg/dl on AM BMP. Coordination of Nutrition Care: Ty Craven RN  Discharge nutrition plan:  Too soon to determine                          Brady Henson [address], [address], [address] [place] [address], [facility] [address]

## 2019-08-25 NOTE — PROGRESS NOTES
Hospitalist Progress Note    Subjective:   Daily Progress Note: 8/25/2019 2:37 PM    Patient presented to ER 8/24 afternoon with an approximate 6 day history of waxing and waning diffuse, crampy abdominal pain, \"all over,\" along with intractable nausea and vomiting. Reports the worst of the pain as just under diaphragm. He has not retained solids or liquids in that time frame. He reports a very small amount semi-formed stool expelled about 3 days ago, prior to that he has had brown and yellow liquid stools in very small amounts. Rarely passing gas. He is extremely obese. Had an 8 cm lap, robotic  ventral hernia repair with mesh placement and a benign lipoma removed from his back July 12 at [facility] that seems to have been incarcerated, then patient describes some type of right lower quad abscess/seroma (?) aspiration 2 days post op. He reports he never had a normal BM post op and was readmitted for ten days and two days respectively for persistent SBO with questionable post op ventral hernia. Last admission 8/15-18 at [facility] with CT report as noted below. He states he felt marginally better on discharge, but when he attempted to eat again, began to vomit. He signed out AMA from both admissions. During both admissions, he was treated conservatively with bowel rest, N/G, antiemetics and IVF. Has not had any additional surgery. In ER, he is found with persistent SBO, N/G placed with gastric fluid return, and administered antiemetics. Additional history as noted below. 8/25:  Tachycardia in the low 100's. Colonic dilation about 6 cm, surgery consulted, Dr Ruth Lips in. Long discussion with patient and wife regarding situation and treatments and the need to stick with surgeon that performed the initial surgery. Neither of them seem to understand that N/G, IV and watchful waiting is a treatment. They have signed out of [employer] AMA x 2.   Some liquid brown fluid expelled from rectum today without any sign of formed fecal material.      Current Facility-Administered Medications   Medication Dose Route Frequency    lactated Ringers infusion  150 mL/hr IntraVENous CONTINUOUS    fat emulsion 20% (LIPOSYN, INTRAlipid) infusion 250 mL  250 mL IntraVENous QPM    TPN ADULT - dextrose 5% amino acid 4.25%   IntraVENous QPM    lactated Ringers infusion  50 mL/hr IntraVENous CONTINUOUS    naloxone (NARCAN) injection 0.4 mg  0.4 mg IntraVENous PRN    ondansetron (ZOFRAN) injection 4 mg  4 mg IntraVENous Q4H PRN    promethazine (PHENERGAN) with saline injection 12.5 mg  12.5 mg IntraVENous Q4H PRN    diphenhydrAMINE (BENADRYL) injection 12.5 mg  12.5 mg IntraVENous Q4H PRN    LORazepam (ATIVAN) injection 1 mg  1 mg IntraVENous Q6H PRN    heparin (porcine) injection 5,000 Units  5,000 Units SubCUTAneous Q8H    insulin lispro (HUMALOG) injection   SubCUTAneous AC&HS    tuberculin injection 5 Units  5 Units IntraDERMal ONCE    pantoprazole (PROTONIX) 40 mg in sodium chloride 0.9% 10 mL injection  40 mg IntraVENous DAILY    hydrALAZINE (APRESOLINE) 20 mg/mL injection 10 mg  10 mg IntraVENous Q6H PRN    morphine injection 8 mg  8 mg IntraVENous Q4H PRN        Review of Systems  A comprehensive review of systems was negative except for that written in the HPI. Objective:     Visit Vitals  /84   Pulse (!) 101   Temp 98.7 °F (37.1 °C)   Resp 18   Ht 5' 6\" (1.676 m)   Wt 113.4 kg (250 lb)   SpO2 96%   BMI 40.35 kg/m²      O2 Device: Room air    Temp (24hrs), Av.3 °F (36.8 °C), Min:97.9 °F (36.6 °C), Max:98.8 °F (37.1 °C)    701 -  190  In: 250 [P.O.:240]  Out: -   1901 -  0700  In: -   Out: 710 [Urine:710]    General appearance: Oriented and alert. Patient and wife both suspicious. Despite multiple explanations, both continue to ask me when surgery will be done, want \"right now. \"  Unkempt. Morbidly obese despite 50# weight loss reported in the past month. Wife at bedside.     Head: Normocephalic, without obvious abnormality, atraumatic  Eyes: conjunctivae/corneas clear. PERRL  Throat: Lips, mucosa, and tongue normal. Teeth and gums dry. Neck: supple, symmetrical, trachea midline, no JVD  Lungs: clear to auscultation bilaterally, slightly diminished in bases. Heart: regular rate and rhythm, S1, S2 normal, no murmur, click, rub or gallop  Abdomen: Pendulous, slightly tense. No rebound or guarding, minimal increased pain with palpation. Rare, faint, tinkling bowel sounds noted. Worst pain in bilateral upper quads. Ventral hernia palpable, does not seem to be incarcerated.   Extremities: extremities normal, atraumatic, no cyanosis or edema  Skin: Skin color, texture, turgor normal. No rashes or lesions  Neurologic: Grossly normal    Data Review  Recent Results (from the past 24 hour(s))   HEMOGLOBIN A1C WITH EAG    Collection Time: 08/24/19  5:24 PM   Result Value Ref Range    Hemoglobin A1c 5.4 4.8 - 6.0 %    Est. average glucose 108 mg/dL   MAGNESIUM    Collection Time: 08/24/19  5:24 PM   Result Value Ref Range    Magnesium 2.0 1.8 - 2.4 mg/dL   PHOSPHORUS    Collection Time: 08/24/19  5:24 PM   Result Value Ref Range    Phosphorus 3.5 2.3 - 3.7 MG/DL   PROTHROMBIN TIME + INR    Collection Time: 08/24/19  5:24 PM   Result Value Ref Range    Prothrombin time 13.2 11.7 - 14.5 sec    INR 1.0     GLUCOSE, POC    Collection Time: 08/24/19  8:51 PM   Result Value Ref Range    Glucose (POC) 83 65 - 100 mg/dL   LIPASE    Collection Time: 08/25/19  4:00 AM   Result Value Ref Range    Lipase 445 (H) 73 - 673 U/L   METABOLIC PANEL, BASIC    Collection Time: 08/25/19  4:00 AM   Result Value Ref Range    Sodium 138 136 - 145 mmol/L    Potassium 3.9 3.5 - 5.1 mmol/L    Chloride 101 98 - 107 mmol/L    CO2 26 21 - 32 mmol/L    Anion gap 11 7 - 16 mmol/L    Glucose 80 65 - 100 mg/dL    BUN 73 (H) 8 - 23 MG/DL    Creatinine 2.69 (H) 0.8 - 1.5 MG/DL    GFR est AA 31 (L) >60 ml/min/1.73m2    GFR est non-AA 25 (L) >60 ml/min/1.73m2    Calcium 8.9 8.3 - 10.4 MG/DL   CBC WITH AUTOMATED DIFF    Collection Time: 08/25/19  4:00 AM   Result Value Ref Range    WBC 6.7 4.3 - 11.1 K/uL    RBC 3.39 (L) 4.23 - 5.6 M/uL    HGB 10.0 (L) 13.6 - 17.2 g/dL    HCT 32.0 (L) 41.1 - 50.3 %    MCV 94.4 79.6 - 97.8 FL    MCH 29.5 26.1 - 32.9 PG    MCHC 31.3 (L) 31.4 - 35.0 g/dL    RDW 15.1 (H) 11.9 - 14.6 %    PLATELET 598 958 - 176 K/uL    MPV 12.1 9.4 - 12.3 FL    ABSOLUTE NRBC 0.00 0.0 - 0.2 K/uL    DF AUTOMATED      NEUTROPHILS 55 43 - 78 %    LYMPHOCYTES 26 13 - 44 %    MONOCYTES 17 (H) 4.0 - 12.0 %    EOSINOPHILS 2 0.5 - 7.8 %    BASOPHILS 0 0.0 - 2.0 %    IMMATURE GRANULOCYTES 1 0.0 - 5.0 %    ABS. NEUTROPHILS 3.7 1.7 - 8.2 K/UL    ABS. LYMPHOCYTES 1.7 0.5 - 4.6 K/UL    ABS. MONOCYTES 1.2 0.1 - 1.3 K/UL    ABS. EOSINOPHILS 0.1 0.0 - 0.8 K/UL    ABS. BASOPHILS 0.0 0.0 - 0.2 K/UL    ABS. IMM. GRANS. 0.0 0.0 - 0.5 K/UL   PHOSPHORUS    Collection Time: 08/25/19  4:00 AM   Result Value Ref Range    Phosphorus 4.3 (H) 2.3 - 3.7 MG/DL   MAGNESIUM    Collection Time: 08/25/19  4:00 AM   Result Value Ref Range    Magnesium 2.0 1.8 - 2.4 mg/dL   TRIGLYCERIDE    Collection Time: 08/25/19  4:00 AM   Result Value Ref Range    Triglyceride 177 (H) 35 - 150 MG/DL   GLUCOSE, POC    Collection Time: 08/25/19  8:23 AM   Result Value Ref Range    Glucose (POC) 84 65 - 100 mg/dL   GLUCOSE, POC    Collection Time: 08/25/19 11:53 AM   Result Value Ref Range    Glucose (POC) 86 65 - 100 mg/dL       8/24:  CXR: No acute findings    8/24:  CT ABDOMEN AND PELVIS: Findings compatible with small bowel obstruction with multiple dilated loops of small bowel seen within the upper abdomen. It is difficult to identify a zone of transition, but multiple decompressed loops of small bowel are present in the  right lower quadrant. Fluid-containing supraumbilical ventral abdominal wall hernia. Cholelithiasis.  Multiple low-density lesions within the spleen which cannot be further  characterized on this exam. Correlation with targeted sonography, as an  outpatient on a nonemergent basis, recommended for further evaluation. 8/24:  KUB:  There is an NG tube present to the right of midline, likely in the distal stomach. There is marked dilation of loops of small bowel. Findings suspicious  for obstruction. 8/24:  KUB:  There is an NG tube which terminates in the stomach. Dilated loops of  small bowel present. OLD RECORDS:   ANMED:  CT ABDOMEN AND PELVIS: 8/15/19:  ACUTE DISTAL SMALL BOWEL OBSTRUCTION LARGE PERIUMBILICAL HERNIA SAC, SEEN ON THE PREVIOUS STUDY OF 7/26/2019, WITHOUT BOWEL CONTENT BUT WITH SIGNIFICANT PERITONEAL FAT CONTENT, POSSIBLY RELATED TO THE SMALL BOWEL OBSTRUCTION. INCIDENTALLY OBSERVED IS UNCOMPLICATED CHOLELITHIASIS  MODERATE BILATERAL RENAL ATROPHY    Assessment/Plan:   Intractable nausea and vomiting, intermittent diarrhea post op    Improved hospital day 1              Dietician consult for TPN              Antiemetics and analgesics              PPI IV daily              N/G to LIS              Daily weight              LR at 125 ml/hr              NPO              I+O  Abdominal pain due to SBO:  Improved hospital day 1              As above  Persistent, recurrent post op Small bowel obstruction              As above              If not resolving in a few days will need surgical consult   S/P robotic assisted ventral hernia repair with mesh placement and benign lipoma removal from back July 12, 2019 :  Concern for mesh complication with bowel involvement.    Possible hernia recurrence vs post op seroma on CT, suspect seroma   Surgery following   Unspecified sleep apnea:  does not require a c-pap  Elevated lipase              Monitor   Morbid obesity with BMI 54              Reports unintentional 50# weight loss post op  Hypertension: on meds at home              Use IV hydralazine prn   NIDDM:  7/2019:  PATIENT INFORMED HE DOES NOT HAVE DIABETES ANY MORE.   GLUCOTROL AND METFORMIN DISCONTINUED at that time              Checking A1C              Routine SSI until A1C is resulted   Generalized weakness              PT/OT consults              CM for discharge planning              Fall precautions  Acute on chronic renal failure, stage 3-4:  Baseline creatinine in low 2's              Daily BMP              Continue IVF              I+O, daily weight   Anemia:  Suspect multifactorial              Monitor daily     Care Plan discussed with: Patient, Dr Bre Bazzi, and Nurse      Signed By: Verenice Foley NP     August 25, 2019

## 2019-08-25 NOTE — PROGRESS NOTES
Problem: Self Care Deficits Care Plan (Adult)  Goal: *Acute Goals and Plan of Care (Insert Text)  Description  Pt is very near his baseline with self care tasks and does not wish for OT to follow him while in acute care. Pt walking around in his room using IV pole as SC. Marisol Quijano helps with all LB ADLs and has \"for years\". Do not anticipate pt will need any further OT at [facility]. Eve Malhotra, MS, OTR/L     Outcome: Progressing Towards Goal       OCCUPATIONAL THERAPY: Initial Assessment, Daily Note, Discharge and PM   8/25/2019  INPATIENT: OT Visit Days: 1  Payor: Juvencio Landis / Plan: [address] / Product Type: Managed Care Medicare /      NAME/AGE/GENDER: John Vera is a 71 y.o. male   PRIMARY DIAGNOSIS:  Small bowel obstruction ([address] [K56.609] Small bowel obstruction ([address]   Small bowel obstruction ([address]          ICD-10: Treatment Diagnosis:    Generalized Muscle Weakness (M62.81)  Other lack of cordination (R27.8)  Difficulty in walking, Not elsewhere classified (R26.2)   Precautions/Allergies:    Falls, Pcn [penicillins]      ASSESSMENT:     Mr. Chuy Joel is a 72 YO R dominant AAM admitted with above. Had prior abdominal surgery at [facility]. Has NG tube hooked up to suction currently. A & O x4. No complaint of pain. Pt reports he \"stays with Sydnie\" who is also present in the room today. Agreeable to OT evaluation but insists he does not need skilled OT to follow him in acute care. Sydnie's reply when asked how he is doing compared to normally at home with her is \"I'll have to go with what he says. \" Pt lives in 1 level home with 5 steps at entrance with R HR. Prefers sponge baths in bed given by J Carlos Mayer. Was independent dressing himself except for socks he reports. J Carlos Mayer helps as needed. J Carlos Mayer does all the cooking, cleaning and laundry but pt reports he could fix a sandwich if he wanted it. Pt still driving and uses SC at all times. No falls in last 6 months.  Admits to sedentary lifestyle and watches TV. Pt was mod I with bed mobility this afternoon, with HOB slightly elevated. Good sitting balance. B UEs are WFLs and strong for age. Good coordination and sensation intact. Sit to stand with CGA and took steps within the length of the NG tubing hooked to suction, about 5 feet x3 reps. Pt then decided he needed to use the BR. RN called to disconnect NG tubing from suction. CGA sit to stand and pt used IV pole as SC/RW into the BR. Sat to toilet with SBA and pulled down adult brief. Voided and had very loose BM. Wiped self and stood, leaked some on toilet seat but pt was able to clean it up himself. Pt flushed toilet and then washed hands at sink in standing. Reports that is 3rd loose stool today. Pt returned to EOB and reported he wanted to stay sitting up. Pt adamant about not wanting/needing any skilled OT. Tomy Hassan concurs that she will be available to help at home. Pt is probably functioning very near his baseline and could benefit from intervention but will adhere to pt's wishes and DC OT at this time. No further skilled OT indicated at this time. This section established at most recent assessment   PROBLEM LIST (Impairments causing functional limitations):  Decreased Activity Tolerance   INTERVENTIONS PLANNED: (Benefits and precautions of occupational therapy have been discussed with the patient.)  Eval and DC, education completed      TREATMENT PLAN: Frequency/Duration: Eval and DC  Rehabilitation Potential For Stated Goals: Good     REHAB RECOMMENDATIONS (at time of discharge pending progress):    Placement: It is my opinion, based on this patient's performance to date, that Mr. Adela Cedeño may benefit from being discharged with NO further skilled therapy due to the high likelihood of returning to baseline.   Equipment:   None at this time              OCCUPATIONAL PROFILE AND HISTORY:   History of Present Injury/Illness (Reason for Referral):  See H&P  Past Medical History/Comorbidities:   Mr. Bonifacio Guzman  has a past medical history of Arthritis, Chronic pain, CKD (chronic kidney disease) (2018), Colon polyps (2018), Hernia of abdominal cavity (08/24/2019), Hypertension, Morbid obesity ([facility] Non-insulin dependent type 2 diabetes mellitus ([facility] Unintentional weight loss (8/24/2019), Unspecified sleep apnea, and Ventral hernia (JULY 2019 POST OP). Mr. Bonifacio Guzman  has a past surgical history that includes hx colonoscopy and hx hernia repair (07/2019). Social History/Living Environment:   Home Environment: Private residence  # Steps to Enter: 5  Rails to Enter: Yes  Hand Rails : Right  Wheelchair Ramp: No  One/Two Story Residence: One story  Living Alone: No  Support Systems: Spouse/Significant Other/Partner  Patient Expects to be Discharged to[de-identified] Private residence  Current DME Used/Available at Home: Cane, straight  Tub or Shower Type: Tub/Shower combination(sponge bathes only)  Prior Level of Function/Work/Activity:  Pt lives in 1 level home with 5 steps at entrance with R HR. Prefers sponge baths in bed given by Micheal Salon. Was independent dressing himself except for socks he reports. Micheal Salon helps as needed. Micheal Salon does all the cooking, cleaning and laundry but pt reports he could fix a sandwich if he wanted it. Pt still driving and uses SC at all times. No falls in last 6 months. Admits to sedentary lifestyle and watches TV. Dominant Side:         RIGHT   Number of Personal Factors/Comorbidities that affect the Plan of Care: Extensive review of physical, cognitive, and psychosocial performance (3+):  HIGH COMPLEXITY   ASSESSMENT OF OCCUPATIONAL PERFORMANCE[de-identified]   Activities of Daily Living:   Basic ADLs (From Assessment) Complex ADLs (From Assessment)   Feeding: Independent  Oral Facial Hygiene/Grooming: Setup  Bathing:  Moderate assistance(Sydnie does all bathing at home)  Upper Body Dressing: Setup  Lower Body Dressing: Maximum assistance(Sydnie donns socks)  Toileting: Stand by assistance(pt voided and had BM, wiped self and cleaned toilet) Instrumental ADL  Meal Preparation: Maximum assistance(Mary cooks, pt can make sandwich)  Homemaking: Total assistance  Medication Management: Setup  Financial Management: Setup   Grooming/Bathing/Dressing Activities of Daily Living     Cognitive Retraining  Safety/Judgement: Awareness of environment; Fall prevention                 Functional Transfers  Bathroom Mobility: Stand-by assistance;Contact guard assistance  Toilet Transfer : Contact guard assistance;Stand-by assistance  Tub Transfer: Maximum assistance  Shower Transfer: Minimum assistance     Bed/Mat Mobility  Rolling: Modified independent  Supine to Sit: Modified independent(HOB elevated)  Sit to Supine: Modified independent  Sit to Stand: Contact guard assistance  Stand to Sit: Contact guard assistance;Stand-by assistance  Bed to Chair: Stand-by assistance;Contact guard assistance  Scooting: Modified independent     Most Recent Physical Functioning:   Gross Assessment:  AROM: Generally decreased, functional  PROM: Within functional limits  Strength: Generally decreased, functional  Coordination: Generally decreased, functional  Tone: Normal  Sensation: Intact               Posture:     Balance:  Sitting: Impaired  Sitting - Static: Good (unsupported)  Sitting - Dynamic: Fair (occasional)  Standing: Impaired; Without support  Standing - Static: Fair  Standing - Dynamic : Fair Bed Mobility:  Rolling: Modified independent  Supine to Sit: Modified independent(HOB elevated)  Sit to Supine: Modified independent  Scooting: Modified independent  Wheelchair Mobility:     Transfers:  Sit to Stand: Contact guard assistance  Stand to Sit: Contact guard assistance;Stand-by assistance  Bed to Chair: Stand-by assistance;Contact guard assistance            Patient Vitals for the past 6 hrs:   BP SpO2 Pulse   08/25/19 0729 142/74 95 % (!) 107   08/25/19 1113 (!) 176/92 96 % (!) 106   08/25/19 1141 160/84 -- (!) 101       Mental Status  Neurologic State: Alert  Orientation Level: Oriented X4  Cognition: Appropriate for age attention/concentration, Appropriate decision making, Appropriate safety awareness, Follows commands  Perception: Appears intact  Perseveration: No perseveration noted  Safety/Judgement: Awareness of environment, Fall prevention                          Physical Skills Involved: Activity Tolerance Cognitive Skills Affected (resulting in the inability to perform in a timely and safe manner):  none  Psychosocial Skills Affected:  Habits/Routines  Environmental Adaptation  Self-Awareness  Awareness of Others   Number of elements that affect the Plan of Care: 3-5:  MODERATE COMPLEXITY   CLINICAL DECISION MAKIN[address] [place] [address] AM-PAC 6 Clicks   Daily Activity Inpatient Short Form  How much help from another person does the patient currently need. .. Total A Lot A Little None   1. Putting on and taking off regular lower body clothing? ? 1   ? 2   ? 3   ? 4   2. Bathing (including washing, rinsing, drying)? ? 1   ? 2   ? 3   ? 4   3. Toileting, which includes using toilet, bedpan or urinal?   ? 1   ? 2   ? 3   ? 4   4. Putting on and taking off regular upper body clothing? ? 1   ? 2   ? 3   ? 4   5. Taking care of personal grooming such as brushing teeth? ? 1   ? 2   ? 3   ? 4   6. Eating meals? ? 1   ? 2   ? 3   ? 4   © , Trustees of [address] [place] [address], under license to [employer]. All rights reserved      Score:  Initial: 18, compared 2019 Most Recent: X (Date: -- )    Interpretation of Tool:  Represents activities that are increasingly more difficult (i.e. Bed mobility, Transfers, Gait). Medical Necessity:     Patient demonstrates good   rehab potential due to higher previous functional level. Reason for Services/Other Comments:  Pt near baseline and does not want/need skilled OT at this time. DC OT.    Use of outcome tool(s) and clinical judgement create a POC that gives a: MODERATE COMPLEXITY         TREATMENT:   (In addition to Assessment/Re-Assessment sessions the following treatments were rendered)     Pre-treatment Symptoms/Complaints: \"Me and [facility] do fine at home. \"  Pain: Initial:   Pain Intensity 1: 0  Post Session:  no complaint of pain     Self Care: (25 mins): Procedure(s) (per grid) utilized to improve and/or restore self-care/home management as related to dressing, toileting, grooming and self feeding. Required minimal visual, verbal, manual, tactile and   cueing to facilitate activities of daily living skills and compensatory activities. Therapeutic Activity: (    15 mins): Therapeutic activities including Bed transfers, Chair transfers, Toilet transfers, Ambulation on level ground, Carry objects and activity tolerance  to improve mobility, strength, balance, coordination and ADLs . Required minimal   to promote dynamic balance in standing. Evaluation: 10 mins     Braces/Orthotics/Lines/Etc:   IV  nasogastric tube  NG tubing hooked to suction   O2 Device: Room air  Treatment/Session Assessment:    Response to Treatment:  good effort, loose stools  Interdisciplinary Collaboration:   Physical Therapist  Occupational Therapist  Registered Nurse  Certified Nursing Assistant/Patient Care Technician  After treatment position/precautions:   Bed/Chair-wheels locked  Bed in low position  Call light within reach  RN notified  Family at bedside  Pt sitting on EOB    Compliance with Program/Exercises: Compliant most of the time. Recommendations/Intent for next treatment session: DC skilled OT, pt near baseline.   Total Treatment Duration:  50 mins  OT Patient Time In/Time Out  Time In: 1215  Time Out: [address] [facility], OT   Teetee Macias MS, OTR/L

## 2019-08-26 LAB
ALBUMIN SERPL-MCNC: 2.5 G/DL (ref 3.2–4.6)
ALBUMIN/GLOB SERPL: 0.6 {RATIO} (ref 1.2–3.5)
ALP SERPL-CCNC: 110 U/L (ref 50–136)
ALT SERPL-CCNC: 15 U/L (ref 12–65)
ANION GAP SERPL CALC-SCNC: 6 MMOL/L (ref 7–16)
AST SERPL-CCNC: 17 U/L (ref 15–37)
BASOPHILS # BLD: 0 K/UL (ref 0–0.2)
BASOPHILS NFR BLD: 1 % (ref 0–2)
BILIRUB SERPL-MCNC: 0.5 MG/DL (ref 0.2–1.1)
BUN SERPL-MCNC: 68 MG/DL (ref 8–23)
C DIFF GDH STL QL: NORMAL
C DIFF TOX A+B STL QL IA: NORMAL
CALCIUM SERPL-MCNC: 8.5 MG/DL (ref 8.3–10.4)
CHLORIDE SERPL-SCNC: 109 MMOL/L (ref 98–107)
CLINICAL CONSIDERATION: NORMAL
CO2 SERPL-SCNC: 27 MMOL/L (ref 21–32)
CREAT SERPL-MCNC: 2.34 MG/DL (ref 0.8–1.5)
DIFFERENTIAL METHOD BLD: ABNORMAL
EOSINOPHIL # BLD: 0.2 K/UL (ref 0–0.8)
EOSINOPHIL NFR BLD: 2 % (ref 0.5–7.8)
ERYTHROCYTE [DISTWIDTH] IN BLOOD BY AUTOMATED COUNT: 15.4 % (ref 11.9–14.6)
GLOBULIN SER CALC-MCNC: 4.3 G/DL (ref 2.3–3.5)
GLUCOSE BLD STRIP.AUTO-MCNC: 113 MG/DL (ref 65–100)
GLUCOSE BLD STRIP.AUTO-MCNC: 127 MG/DL (ref 65–100)
GLUCOSE BLD STRIP.AUTO-MCNC: 135 MG/DL (ref 65–100)
GLUCOSE BLD STRIP.AUTO-MCNC: 162 MG/DL (ref 65–100)
GLUCOSE SERPL-MCNC: 118 MG/DL (ref 65–100)
HCT VFR BLD AUTO: 30.1 % (ref 41.1–50.3)
HEMOCCULT STL QL: NEGATIVE
HGB BLD-MCNC: 9.3 G/DL (ref 13.6–17.2)
IMM GRANULOCYTES # BLD AUTO: 0.1 K/UL (ref 0–0.5)
IMM GRANULOCYTES NFR BLD AUTO: 1 % (ref 0–5)
INTERPRETATION: NORMAL
LIPASE SERPL-CCNC: 476 U/L (ref 73–393)
LYMPHOCYTES # BLD: 1.4 K/UL (ref 0.5–4.6)
LYMPHOCYTES NFR BLD: 17 % (ref 13–44)
MAGNESIUM SERPL-MCNC: 2 MG/DL (ref 1.8–2.4)
MCH RBC QN AUTO: 29.9 PG (ref 26.1–32.9)
MCHC RBC AUTO-ENTMCNC: 30.9 G/DL (ref 31.4–35)
MCV RBC AUTO: 96.8 FL (ref 79.6–97.8)
MM INDURATION POC: 0 MM (ref 0–5)
MONOCYTES # BLD: 1.2 K/UL (ref 0.1–1.3)
MONOCYTES NFR BLD: 14 % (ref 4–12)
NEUTS SEG # BLD: 5.7 K/UL (ref 1.7–8.2)
NEUTS SEG NFR BLD: 66 % (ref 43–78)
NRBC # BLD: 0 K/UL (ref 0–0.2)
PCR REFLEX: NORMAL
PHOSPHATE SERPL-MCNC: 2.7 MG/DL (ref 2.3–3.7)
PLATELET # BLD AUTO: 351 K/UL (ref 150–450)
PMV BLD AUTO: 12 FL (ref 9.4–12.3)
POTASSIUM SERPL-SCNC: 4.1 MMOL/L (ref 3.5–5.1)
PPD POC: NEGATIVE NEGATIVE
PROT SERPL-MCNC: 6.8 G/DL (ref 6.3–8.2)
RBC # BLD AUTO: 3.11 M/UL (ref 4.23–5.6)
SODIUM SERPL-SCNC: 142 MMOL/L (ref 136–145)
TRIGL SERPL-MCNC: 176 MG/DL (ref 35–150)
WBC # BLD AUTO: 8.6 K/UL (ref 4.3–11.1)

## 2019-08-26 PROCEDURE — 85025 COMPLETE CBC W/AUTO DIFF WBC: CPT

## 2019-08-26 PROCEDURE — 74011000250 HC RX REV CODE- 250: Performed by: NURSE PRACTITIONER

## 2019-08-26 PROCEDURE — 36415 COLL VENOUS BLD VENIPUNCTURE: CPT

## 2019-08-26 PROCEDURE — 84478 ASSAY OF TRIGLYCERIDES: CPT

## 2019-08-26 PROCEDURE — 74011000258 HC RX REV CODE- 258: Performed by: NURSE PRACTITIONER

## 2019-08-26 PROCEDURE — 74011636637 HC RX REV CODE- 636/637: Performed by: NURSE PRACTITIONER

## 2019-08-26 PROCEDURE — 74011250636 HC RX REV CODE- 250/636: Performed by: NURSE PRACTITIONER

## 2019-08-26 PROCEDURE — 80053 COMPREHEN METABOLIC PANEL: CPT

## 2019-08-26 PROCEDURE — 84100 ASSAY OF PHOSPHORUS: CPT

## 2019-08-26 PROCEDURE — C9113 INJ PANTOPRAZOLE SODIUM, VIA: HCPCS | Performed by: NURSE PRACTITIONER

## 2019-08-26 PROCEDURE — 83690 ASSAY OF LIPASE: CPT

## 2019-08-26 PROCEDURE — 65270000029 HC RM PRIVATE

## 2019-08-26 PROCEDURE — 82962 GLUCOSE BLOOD TEST: CPT

## 2019-08-26 PROCEDURE — 74011250636 HC RX REV CODE- 250/636: Performed by: FAMILY MEDICINE

## 2019-08-26 PROCEDURE — 83735 ASSAY OF MAGNESIUM: CPT

## 2019-08-26 RX ORDER — LIDOCAINE 4 G/100G
1 PATCH TOPICAL EVERY 24 HOURS
Status: DISCONTINUED | OUTPATIENT
Start: 2019-08-26 | End: 2019-08-28 | Stop reason: HOSPADM

## 2019-08-26 RX ORDER — MORPHINE SULFATE 2 MG/ML
2 INJECTION, SOLUTION INTRAMUSCULAR; INTRAVENOUS
Status: DISCONTINUED | OUTPATIENT
Start: 2019-08-26 | End: 2019-08-28 | Stop reason: HOSPADM

## 2019-08-26 RX ORDER — HYDROCODONE BITARTRATE AND ACETAMINOPHEN 5; 325 MG/1; MG/1
1 TABLET ORAL
Status: DISCONTINUED | OUTPATIENT
Start: 2019-08-26 | End: 2019-08-28 | Stop reason: HOSPADM

## 2019-08-26 RX ADMIN — MORPHINE SULFATE 4 MG: 2 INJECTION, SOLUTION INTRAMUSCULAR; INTRAVENOUS at 04:58

## 2019-08-26 RX ADMIN — HEPARIN SODIUM 5000 UNITS: 5000 INJECTION INTRAVENOUS; SUBCUTANEOUS at 08:52

## 2019-08-26 RX ADMIN — INSULIN LISPRO 2 UNITS: 100 INJECTION, SOLUTION INTRAVENOUS; SUBCUTANEOUS at 17:49

## 2019-08-26 RX ADMIN — SOYBEAN OIL 250 ML: 20 INJECTION, SOLUTION INTRAVENOUS at 17:40

## 2019-08-26 RX ADMIN — HEPARIN SODIUM 5000 UNITS: 5000 INJECTION INTRAVENOUS; SUBCUTANEOUS at 17:49

## 2019-08-26 RX ADMIN — ONDANSETRON 4 MG: 2 INJECTION INTRAMUSCULAR; INTRAVENOUS at 10:20

## 2019-08-26 RX ADMIN — HEPARIN SODIUM 5000 UNITS: 5000 INJECTION INTRAVENOUS; SUBCUTANEOUS at 01:42

## 2019-08-26 RX ADMIN — SODIUM CHLORIDE 40 MG: 9 INJECTION INTRAMUSCULAR; INTRAVENOUS; SUBCUTANEOUS at 08:53

## 2019-08-26 RX ADMIN — POTASSIUM CHLORIDE: 2 INJECTION, SOLUTION, CONCENTRATE INTRAVENOUS at 17:41

## 2019-08-26 NOTE — PROGRESS NOTES
PT Note:  PT orders received and chart review initiated. Attempted evaluation again. For second day in a row, patient refused to get OOB, stating that his knee is hurting. Educated patient on effects of immobility but he continued to refuse stating \"maybe tomorrow\". If patient refuses again tomorrow we will d/c PT.   Thank you, DAYAMI Ray, PT, DPT

## 2019-08-26 NOTE — PROGRESS NOTES
Attempted visit  Staff is providing compassionate care in room  Noted that patient has multiple health issues  Will follow    Eleni Dorman, staff [address], [address]  /   [email or web address]

## 2019-08-26 NOTE — PROGRESS NOTES
Nutrition F/U  Referral received from Malnutrition Screening Tool for recently lost weight  ( >33 pounds) without trying and eating poorly d/t decreased appetite  Patient denies weight loss and poor intake. TPN management per nutritional support protocols. (Marga Peace NP)  Assessment:   Diet orders: 8-24: NPO    Central line has not yet been placed. Plan is for placement of line in IR as Nephrology prefers to avoid use of PICC line d/t hx of CKD  TPN dependent d/t recurrent SBO. NGT to LIS. Phos 2.7-improved to WNL. Current TPN contains no Phos though lipids do provide some Phos. May benefit from addition of Phos to TPN. POC Glucoses:   mg/dl; has not required any SSI yet. H/O DM Anthropometrics:Height: 5' 6\" (167.6 cm),  Weight Source: Bed, Weight: 97 kg (213 lb 13.5 oz), Body mass index is 34.52 kg/m². Pt reports there is no way he weighs 213# as he was 250# on a standing scale on Friday. He weighed 308# on a standing scale prior to his surgery on 7-12-19. Since then he has kept little down po.   Pt with ~19% change iin weight in ~6 weeks c/w severe change in weight  Macronutrient needs:(IBW 64.5 kg)  EER:  0522-1571 kcal /day (20-25 kcal/kg IBW)  Revised EPR:  52-84 grams protein/day (0.8-1.3 grams/kg IBW)-anabolism in CKD,   Max CHO:  372 grams/day (4mg/kg IBW/min)  Fluid:  1ml/kcal - or per MD  Intake/Comparative Standards: Current PPN:  5%DEX/ 4.25%AA at 85 ml/hr with 250 ml 20% Lipids daily provides  1180 kcal/d (91% of needs), 85 grams of protein/d (100% of needs, ~1.3 gm PRO/kgIBW), 100 grams of CHO/d (does not exceed maximum CHO load) and 2250 ml of total volume/d (100% of needs)  Acute Illness, Chronic Illness, or Social/Enviornmental: Chronic illness  Energy Intake: Less than/equal to 75% of est energy req for greater than/equal to 1 month  Weight Loss: Greater than 5% x 1 mo  Chronic Illness - Malnutrition Diagnosis: Severe malnutrition  Intervention:   Meals and snacks: NPO  Nutritional Supplement Therapy: Electrolyte replacement per nutritional support protocols are active on MAR. PN:   1. Continue current PPN. Awaiting central line access. 2. Additive changes: Add 10 meq KPhos/L, decrease KCl to 10 meq/L.  3. Discontinue LR IVF. Coordination of Nutrition Care: Discussed with Karolyn Christina RN  If central line is placed then PPN solution will be infused thru the central line.  Otherwise it can continue to infuse thru the peripheral line,    Flower Zuniga RD, LD, [facility] [phone]

## 2019-08-26 NOTE — PROGRESS NOTES
H&P/Consult Note/Progress Note/Office Note:   Ruchi Higgins  MRN: [id number]  :1955  Age:64 y.o.    HPI: Ruchi Higgins is a 71 y.o. male with h/o CHF, obesity, DM, CKD who recently had surgery at [facility].    19 s/p robotic assisted 8cm ventral hernia repair with 4.5cm x 11.4cm Ventralight STmesh at [facility]   (and benign lipoma resection from back) by Dr Edis Estrada ([phone])   DIAGNOSIS: BENIGN FATTY TISSUE, CONSISTENT WITH LIPOMA. Ha Brown M.D.  (Electronically Signed)  He describes having a seroma aspirated by Dr Alton Scherer post-op. 8/15/19 He was admitted to [facility] with recurrent hernia and SBO and followed by surgery there with NGT  He did not have surgery and was discharged after approx 2 days    8/15/19 CT abd/pelvis at [facility]  IMPRESSION:  [address]UM[address], SEEN ON THE PREVIOUS STUDY OF 2019, WITHOUT  BOWEL CONTENT BUT WITH SIGNIFICANT PERITONEAL FAT CONTENT, POSSIBLY RELATED TO  THE SMALL BOWEL OBSTRUCTION    INCIDENTALLY OBSERVED IS UNCOMPLICATED CHOLELITHIASIS    MODERATE BILATERAL RENAL ATROPHY      He came [facility] the [facility] ER on 19 with progressive, constant, severe N/V/abd for >1 week. Abd pain involved upper abdomen and was generalized and non-radiating  CT is shown below with recurrent SBO  Nothing made symptoms better or worse. No associated fevers  He was admitted by Hospitalists  Gen Surgery was consulted by Dr Billy Brito. Simran Gutierrez who called our service. NGT placed and palcement confirmed on abd Xray        19 CT abd/pelvis with oral but no IV contrast  Hx: Abd pain, generalized, with vomiting. Cough. 50 pound weight loss in 1month      FINDINGS: There is minimal bibasilar atelectasis.     CT ABD: No contour deforming abnormality of the liver. Multiple low-density  lesions seen within the spleen. Gallstones present in the gallbladder.  The  adrenal glands and pancreas are normal. No renal or ureteral calculi  demonstrated. There are multiple dilated loops of small bowel measuring up to 6  cm. It is difficult to identify a zone of transition, but multiple decompressed  loops of small bowel are present in the right lower quadrant, and there is  colonic decompression present as well. There is a fluid-filled supraumbilical  abdominal wall hernia present, midline.     CT PELVIS: The bladder and rectum are normal. No pelvic adenopathy demonstrated. Small pelvic free fluid present.     Bone window evaluation demonstrates no aggressive osseous lesions.       IMPRESSION:  1. Findings compatible with small bowel obstruction with multiple dilated loops  of small bowel seen within the upper abdomen. It is difficult to identify a zone  of transition, but multiple decompressed loops of small bowel are present in the  right lower quadrant. 2. Fluid-containing supraumbilical ventral abdominal wall hernia. 3. Cholelithiasis. 4. Multiple low-density lesions within the spleen which cannot be further  characterized on this exam. Correlation with targeted sonography, as an  outpatient on a nonemergent basis, recommended for further evaluation      He previously had colonoscopy at [facility] as shown below. 10/25/18 s/p colonoscopy at [facility]; Dr Romario Arreaga MD--->  \"Normal\" ; no specimens; next planned in 5yrs      Additional hx:  8/25/19 no new issues; sleeping  8/26/19 +flatus and BM; abd softer            Past Medical History:   Diagnosis Date    Arthritis     Chronic pain     arthritic    CKD (chronic kidney disease) 2018    BASELINE CREATININE IN LOW 2'S    Colon polyps 2018    Hernia of abdominal cavity 79/04/4509    POST OP UMBILICAL HERNIA 5/24 WITH RESULTANT VENTRAL HERNIA, NON INCARCERATED.  Hypertension     ON MEDS    Morbid obesity ([address]     BMI 47    Non-insulin dependent type 2 diabetes mellitus ([address]     WAS TOLD HE DID NOT HAVE DM ANY MORE IN [place] OF 2019.   UNKNOWN A1 C    Unintentional weight loss 8/24/2019    50# POST OP July-AUGUST 2019    Unspecified sleep apnea     does not require a c-pap    Ventral hernia JULY 2019 POST OP     Past Surgical History:   Procedure Laterality Date    HX COLONOSCOPY      HX HERNIA REPAIR  07/2019     Current Facility-Administered Medications   Medication Dose Route Frequency    fat emulsion 20% (LIPOSYN, INTRAlipid) infusion 250 mL  250 mL IntraVENous QPM    TPN ADULT - dextrose 5% amino acid 4.25%   IntraVENous QPM    lactated Ringers infusion  50 mL/hr IntraVENous CONTINUOUS    naloxone (NARCAN) injection 0.4 mg  0.4 mg IntraVENous PRN    ondansetron (ZOFRAN) injection 4 mg  4 mg IntraVENous Q4H PRN    promethazine (PHENERGAN) with saline injection 12.5 mg  12.5 mg IntraVENous Q4H PRN    diphenhydrAMINE (BENADRYL) injection 12.5 mg  12.5 mg IntraVENous Q4H PRN    LORazepam (ATIVAN) injection 1 mg  1 mg IntraVENous Q6H PRN    heparin (porcine) injection 5,000 Units  5,000 Units SubCUTAneous Q8H    insulin lispro (HUMALOG) injection   SubCUTAneous AC&HS    pantoprazole (PROTONIX) 40 mg in sodium chloride 0.9% 10 mL injection  40 mg IntraVENous DAILY    hydrALAZINE (APRESOLINE) 20 mg/mL injection 10 mg  10 mg IntraVENous Q6H PRN    morphine injection 8 mg  8 mg IntraVENous Q4H PRN     Pcn [penicillins]  Social History     Socioeconomic History    Marital status:      Spouse name: Not on file    Number of children: Not on file    Years of education: Not on file    Highest education level: Not on file   Tobacco Use    Smoking status: Never Smoker    Smokeless tobacco: Never Used   Substance and Sexual Activity    Alcohol use: No    Drug use: No   Social History Narrative    8/24:  PATIENT IS RETIRED, LIVES WITH WIFE PHAM. ([phone]). HE DOES NOT HAVE ANY CHILDREN.       Social History     Tobacco Use   Smoking Status Never Smoker   Smokeless Tobacco Never Used     Family History   Problem Relation Age of Onset    Diabetes Mother     Cancer Sister     Stroke Sister     Heart Disease Sister      ROS: The patient has no difficulty with chest pain or shortness of breath. No fever or chills. Comprehensive review of systems was otherwise unremarkable except as noted above. Physical Exam:   Visit Vitals  /83 (BP 1 Location: Right arm, BP Patient Position: At rest)   Pulse 96   Temp 98.1 °F (36.7 °C)   Resp 18   Ht 5' 6\" (1.676 m)   Wt 213 lb 13.5 oz (97 kg)   SpO2 97%   BMI 34.52 kg/m²     Vitals:    08/25/19 1957 08/25/19 2300 08/26/19 0340 08/26/19 0626   BP: 164/82 134/88 157/69 158/83   Pulse: (!) 109 (!) 108 (!) 102 96   Resp: 18 18 18 18   Temp: 98.1 °F (36.7 °C) 98.2 °F (36.8 °C) 98.2 °F (36.8 °C) 98.1 °F (36.7 °C)   SpO2: 97% 96% 96% 97%   Weight:    213 lb 13.5 oz (97 kg)   Height:         No intake/output data recorded. 08/24 1901 - 08/26 0700  In: 250 [P.O.:240]  Out: 1445 [Urine:1445]      Constitutional: Alert, oriented, cooperative patient in no acute distress; appears stated age    Eyes:Sclera are clear. EOMs intact  ENMT: no external lesions gross hearing normal; no obvious neck masses, no ear or lip lesions, nares normal; +NGT  CV: RRR. Normal perfusion  Resp: No JVD. Breathing is  non-labored; no audible wheezing. GI: obese, healed trochar sites; cannot palpate hernia but his obesity limits exam; Upper abdomen is much less tender; No guarding or rebound      Musculoskeletal: unremarkable with normal function. No embolic signs or cyanosis.    Neuro:  Oriented; moves all 4; no focal deficits  Psychiatric: normal affect and mood, no memory impairment    Recent vitals (if inpt):  Patient Vitals for the past 24 hrs:   BP Temp Pulse Resp SpO2 Weight   08/26/19 0626 158/83 98.1 °F (36.7 °C) 96 18 97 % 213 lb 13.5 oz (97 kg)   08/26/19 0340 157/69 98.2 °F (36.8 °C) (!) 102 18 96 %    08/25/19 2300 134/88 98.2 °F (36.8 °C) (!) 108 18 96 %    08/25/19 1957 164/82 98.1 °F (36.7 °C) (!) 109 18 97 %    08/25/19 1141 160/84  (!) [address]   08/25/19 1113 (!) 176/92 98.7 °F (37.1 °C) (!) 106 18 96 %        Labs:  Recent Labs     08/26/19  0415  08/24/19  1724   WBC 8.6   < >  --    HGB 9.3*   < >  --       < >  --       < >  --    K 4.1   < >  --    *   < >  --    CO2 27   < >  --    BUN 68*   < >  --    CREA 2.34*   < >  --    *   < >  --    PTP  --   --  13.2   INR  --   --  1.0   TBILI 0.5  --   --    SGOT 17  --   --    ALT 15  --   --      --   --    LPSE 476*   < >  --     < > = values in this interval not displayed. Lab Results   Component Value Date/Time    WBC 8.6 08/26/2019 04:15 AM    HGB 9.3 (L) 08/26/2019 04:15 AM    PLATELET 066 84/08/7453 04:15 AM    Sodium 142 08/26/2019 04:15 AM    Potassium 4.1 08/26/2019 04:15 AM    Chloride 109 (H) 08/26/2019 04:15 AM    CO2 27 08/26/2019 04:15 AM    BUN 68 (H) 08/26/2019 04:15 AM    Creatinine 2.34 (H) 08/26/2019 04:15 AM    Glucose 118 (H) 08/26/2019 04:15 AM    INR 1.0 08/24/2019 05:24 PM    Bilirubin, total 0.5 08/26/2019 04:15 AM    AST (SGOT) 17 08/26/2019 04:15 AM    ALT (SGPT) 15 08/26/2019 04:15 AM    Alk. phosphatase 110 08/26/2019 04:15 AM    Lipase 476 (H) 08/26/2019 04:15 AM       CT Results  (Last 48 hours)               08/24/19 1403  CT ABD PELV WO CONT Final result    Impression:  IMPRESSION:   1. Findings compatible with small bowel obstruction with multiple dilated loops   of small bowel seen within the upper abdomen. It is difficult to identify a zone   of transition, but multiple decompressed loops of small bowel are present in the   right lower quadrant. 2. Fluid-containing supraumbilical ventral abdominal wall hernia. 3. Cholelithiasis. 4. Multiple low-density lesions within the spleen which cannot be further   characterized on this exam. Correlation with targeted sonography, as an   outpatient on a nonemergent basis, recommended for further evaluation.        Narrative:  History: Abdominal pain, generalized, with vomiting. Cough. 50 pound weight loss   in one month. EXAM: CT abdomen and pelvis with oral contrast only       TECHNIQUE: Thin section axial CT images are obtained from the lung bases through   the pubic symphysis. Radiation dose reduction techniques were used for this   study. Our CT scanners use one or all of the following: Automated exposure   control, adjustment of the mA and/or kV according to patient size, use of   iterative reconstruction. No comparison       FINDINGS: There is minimal bibasilar atelectasis. CT ABDOMEN: No contour deforming abnormality of the liver. Multiple low-density   lesions seen within the spleen. Gallstones present in the gallbladder. The   adrenal glands and pancreas are normal. No renal or ureteral calculi   demonstrated. There are multiple dilated loops of small bowel measuring up to 6   cm. It is difficult to identify a zone of transition, but multiple decompressed   loops of small bowel are present in the right lower quadrant, and there is   colonic decompression present as well. There is a fluid-filled supraumbilical   abdominal wall hernia present, midline. CT PELVIS: The bladder and rectum are normal. No pelvic adenopathy demonstrated. Small pelvic free fluid present. Bone window evaluation demonstrates no aggressive osseous lesions. chest X-ray      I reviewed recent labs, recent radiologic studies, and pertinent records including other doctor notes if needed. I independently reviewed radiology images for studies I described above or studies I have ordered.    Admission date (for inpatients): 8/24/2019   * No surgery found *  * No surgery found *    ASSESSMENT/PLAN:  Problem List  Date Reviewed: 8/24/2019          Codes Class Noted    * (Principal) Small bowel obstruction ([facility] ICD-10-CM: U81.661  ICD-9-CM: 560.9  8/24/2019        Unspecified sleep apnea (Chronic) ICD-10-CM: G47.30  ICD-9-CM: 780.57  8/24/2019    Overview Signed 8/24/2019  4:31 PM by Sebastien Uribe NP     does not require a c-pap             Morbid obesity ([facility] (Chronic) ICD-10-CM: E66.01  ICD-9-CM: 278.01  8/24/2019    Overview Signed 8/24/2019  4:32 PM by Sebastien Uribe NP     BMI 54  HAS LOST 50# POST OP July 2019 UNTIL AUGUST 24, 2019             Unintentional weight loss ICD-10-CM: R63.4  ICD-9-CM: 783.21  8/24/2019    Overview Signed 8/24/2019  4:33 PM by Sebastien Uribe NP     50# POST OP July-AUGUST 2019             Hypertension (Chronic) ICD-10-CM: I10  ICD-9-CM: 401.9  8/24/2019        Diabetes ([facility] (Chronic) ICD-10-CM: E11.9  ICD-9-CM: 250.00  8/24/2019    Overview Signed 8/24/2019  4:34 PM by Sebastien Uribe NP     7/2019:  PATIENT INFORMED HE DOES NOT HAVE DIABETES ANY MORE. GLUCOTROL AND METFORMIN DISCONTINUED. Hernia of abdominal cavity (Chronic) ICD-10-CM: K46.9  ICD-9-CM: 553.9  8/24/2019    Overview Signed 8/24/2019  4:35 PM by Sebastien Uribe NP     POST OP UMBILICAL HERNIA 3/87 WITH RESULTANT VENTRAL HERNIA, NON INCARCERATED. Diarrhea ICD-10-CM: R19.7  ICD-9-CM: 787.91  8/24/2019    Overview Signed 8/24/2019  4:36 PM by Sebastien Uribe NP     INTERMITTENT SINCE SURGERY 7/2019             Weakness ICD-10-CM: R53.1  ICD-9-CM: 780.79  8/24/2019    Overview Signed 8/24/2019  4:36 PM by Sebastien Uribe NP     ARMS AND LEGS MOSTLY.               Acute renal failure superimposed on stage 4 chronic kidney disease ([facility] ICD-10-CM: N17.9, N18.4  ICD-9-CM: 584.9, 585.4  8/24/2019    Overview Signed 8/24/2019  4:37 PM by Sebastien Uribe NP     [place] [address]  NEPHROLOGY:  YANIV LOYOLA              Intractable nausea and vomiting ICD-10-CM: R11.2  ICD-9-CM: 536.2  8/24/2019    Overview Signed 8/24/2019  4:37 PM by Sebastien Uribe NP     INTERMITTENTLY [facility] SURGERY July 2019             Abdominal pain (Chronic) ICD-10-CM: R10.9  ICD-9-CM: 789.00  8/24/2019 Constipation ICD-10-CM: K59.00  ICD-9-CM: 564.00  8/24/2019        Anemia ICD-10-CM: D64.9  ICD-9-CM: 285.9  8/24/2019        Elevated lipase ICD-10-CM: R74.8  ICD-9-CM: 790.5  8/24/2019        Recurrent ventral hernia ICD-10-CM: K43.2  ICD-9-CM: 553.21  8/24/2019            Principal Problem:    Small bowel obstruction ([facility] (8/24/2019)    Active Problems:    Unspecified sleep apnea (8/24/2019)      Overview: does not require a c-pap      Morbid obesity ([address] (8/24/2019)      Overview: BMI 54      HAS LOST 50# POST OP July 2019 UNTIL AUGUST 24, 2019      Unintentional weight loss (8/24/2019)      Overview: 50# POST OP July-AUGUST 2019      Hypertension (8/24/2019)      Diabetes ([facility] (8/24/2019)      Overview: 7/2019:  PATIENT INFORMED HE DOES NOT HAVE DIABETES ANY MORE. GLUCOTROL       AND METFORMIN DISCONTINUED. Hernia of abdominal cavity (8/24/2019)      Overview: POST OP UMBILICAL HERNIA 3/44 WITH RESULTANT VENTRAL HERNIA, NON       INCARCERATED. Diarrhea (8/24/2019)      Overview: INTERMITTENT SINCE SURGERY 7/2019      Weakness (8/24/2019)      Overview: ARMS AND LEGS MOSTLY.        Acute renal failure superimposed on stage 4 chronic kidney disease ([facility] (8/24/2019)      Overview: BASELINE CREATININE IN LOW 2'S      NEPHROLOGY:  YANIV LOYOLA       Intractable nausea and vomiting (8/24/2019)      Overview: INTERMITTENTLY SINCE SURGERY July 2019      Abdominal pain (8/24/2019)      Constipation (8/24/2019)      Anemia (8/24/2019)      Elevated lipase (8/24/2019)      Recurrent ventral hernia (8/24/2019)         Recurrent SBO  He is improving  +flatus and BM on 8/25/19  Exam better    Recent robotic ventral hernia surgery with large piece of mesh at [facility] by Dr Stefan Truong  He may have a mesh complication with bowel involvement  I told pt that I do not perform robotic ventral hernia repair  NPO/IVF  Bowel rest  NGT-->LISx    Likely repeat CT on Wednesday 8/28/19    Possible hernia recurrence vs post-op seroma on CT  Suspect this is only a seroma and not a recurrent hernia based on hx and images  Follow    Renal Insufficiency  Unsure of baseline as we have no old labs  He thinks baseline Cr approx 2.5  Hydrate   Daily labs    Morbid obesity  Encourage weight loss              I have personally performed a face-to-face diagnostic evaluation and management  service on this patient. I have independently seen the patient. I have independently obtained the above history from the patient/family. I have independently examined the patient with above findings. I have independently reviewed data/labs for this patient and developed the above plan of care (MDM). Signed: Indio Da Silva.  Sergey Lundberg MD, FACS

## 2019-08-26 NOTE — PROGRESS NOTES
Spoke with [address] regarding PICC placement. This patient has a diagnosis of CKD 3-4 with a creatinine greater than 2.0, and will need to have nephrology approval for PICC line placement.

## 2019-08-26 NOTE — PROGRESS NOTES
Progress Note    2019  Admit Date: 2019 11:17 AM   NAME: Brenton Coffman   :  1955   MRN:  [id number]   Attending: Junior Shook MD  PCP:  Dylan Rico MD  Treatment Team: Attending Provider: Junior Shook MD; Hospitalist: Junior Shook MD; Hospitalist: Richard Plasencia NP; Consulting Provider: Thaddeus Sargent MD; Primary Nurse: Montserrat Nicolas RN; Utilization Review: Korene Angelucci, RN; Care Manager: Vamshi Archer RN; Physical Therapist: Anny Fletcher, PT, DPT    Full Code   SUBJECTIVE:   Mr. Jaime Almodovar is a 72 yo male with PMH of CKD, HTN, morbid obesity, DM II, RAFFI not on CPAP who is s/p 8 cm lap, robotic ventral hernia repair with mesh placement and a benign lipoma removed from his back  at [facility] that seems to have incarcerated, then patient describes some type of right lower quadrant abscess/seroma aspiration 2 days post op. States readmitted post op for persistent SBO. Both admissions at [facility] pt left AMA. [facility] like conservative management with bowel rest, NGT, antiemetics, IVF wasn't aggressive enough therefore prompted the AMA. He presented to our ED with c/o n/v, crampy abd pain. He was found to have a persistent SBO. NGT placed to LIS. General Surgery consulted, continue with conservative management. Pt needs to follow with his surgeon at  [facility]. Also found with elevated creatinine. Baseline appears around 2-2. 3. Creatinine trending down after IVF. Has PPN in place. Reports small BM last night, liquid, brown in color. Wife agitated at plan of care. Pt is more understanding today and agreeable. Past Medical History:   Diagnosis Date    Arthritis     Chronic pain     arthritic    CKD (chronic kidney disease) 2018    BASELINE CREATININE IN LOW 2'S    Colon polyps 2018    Hernia of abdominal cavity     POST OP UMBILICAL HERNIA  WITH RESULTANT VENTRAL HERNIA, NON INCARCERATED.      Hypertension     ON MEDS    Morbid obesity ([facility]     BMI 54    Non-insulin dependent type 2 diabetes mellitus ([facility] [address]     WAS TOLD HE DID NOT HAVE DM ANY MORE IN [place] OF 2019. UNKNOWN A1 C    Unintentional weight loss 8/24/2019    50# POST OP July-AUGUST 2019    Unspecified sleep apnea     does not require a c-pap    Ventral hernia JULY 2019 POST OP     Recent Results (from the past 24 hour(s))   GLUCOSE, POC    Collection Time: 08/25/19 11:53 AM   Result Value Ref Range    Glucose (POC) 86 65 - 100 mg/dL   GLUCOSE, POC    Collection Time: 08/25/19  5:14 PM   Result Value Ref Range    Glucose (POC) 68 65 - 100 mg/dL   URINALYSIS W/ RFLX MICROSCOPIC    Collection Time: 08/25/19  7:02 PM   Result Value Ref Range    Color YELLOW      Appearance CLEAR      Specific gravity 1.012 1.001 - 1.023      pH (UA) 6.0 5.0 - 9.0      Protein 100 (A) NEG mg/dL    Glucose NEGATIVE  mg/dL    Ketone TRACE (A) NEG mg/dL    Bilirubin SMALL (A) NEG      Blood NEGATIVE  NEG      Urobilinogen 0.2 0.2 - 1.0 EU/dL    Nitrites NEGATIVE  NEG      Leukocyte Esterase NEGATIVE  NEG     CULTURE, URINE    Collection Time: 08/25/19  7:02 PM   Result Value Ref Range    Special Requests: NO SPECIAL REQUESTS      Culture result:        NO GROWTH AFTER SHORT PERIOD OF INCUBATION. FURTHER RESULTS TO FOLLOW AFTER OVERNIGHT INCUBATION.    URINE MICROSCOPIC    Collection Time: 08/25/19  7:02 PM   Result Value Ref Range    WBC 0-3 0 /hpf    RBC 0-3 0 /hpf    Epithelial cells 0-3 0 /hpf    Bacteria 0 0 /hpf    Casts 0 0 /lpf    Crystals, urine 0 0 /LPF    Mucus 0 0 /lpf   GLUCOSE, POC    Collection Time: 08/25/19  8:55 PM   Result Value Ref Range    Glucose (POC) 95 65 - 100 mg/dL   CBC WITH AUTOMATED DIFF    Collection Time: 08/26/19  4:15 AM   Result Value Ref Range    WBC 8.6 4.3 - 11.1 K/uL    RBC 3.11 (L) 4.23 - 5.6 M/uL    HGB 9.3 (L) 13.6 - 17.2 g/dL    HCT 30.1 (L) 41.1 - 50.3 %    MCV 96.8 79.6 - 97.8 FL    MCH 29.9 26.1 - 32.9 PG    MCHC 30.9 (L) 31.4 - 35.0 g/dL    RDW 15.4 (H) 11.9 - 14.6 %    PLATELET 088 853 - 405 K/uL    MPV 12.0 9.4 - 12.3 FL    ABSOLUTE NRBC 0.00 0.0 - 0.2 K/uL    DF AUTOMATED      NEUTROPHILS 66 43 - 78 %    LYMPHOCYTES 17 13 - 44 %    MONOCYTES 14 (H) 4.0 - 12.0 %    EOSINOPHILS 2 0.5 - 7.8 %    BASOPHILS 1 0.0 - 2.0 %    IMMATURE GRANULOCYTES 1 0.0 - 5.0 %    ABS. NEUTROPHILS 5.7 1.7 - 8.2 K/UL    ABS. LYMPHOCYTES 1.4 0.5 - 4.6 K/UL    ABS. MONOCYTES 1.2 0.1 - 1.3 K/UL    ABS. EOSINOPHILS 0.2 0.0 - 0.8 K/UL    ABS. BASOPHILS 0.0 0.0 - 0.2 K/UL    ABS. IMM. GRANS. 0.1 0.0 - 0.5 K/UL   PHOSPHORUS    Collection Time: 08/26/19  4:15 AM   Result Value Ref Range    Phosphorus 2.7 2.3 - 3.7 MG/DL   MAGNESIUM    Collection Time: 08/26/19  4:15 AM   Result Value Ref Range    Magnesium 2.0 1.8 - 2.4 mg/dL   TRIGLYCERIDE    Collection Time: 08/26/19  4:15 AM   Result Value Ref Range    Triglyceride 176 (H) 35 - 547 MG/DL   METABOLIC PANEL, COMPREHENSIVE    Collection Time: 08/26/19  4:15 AM   Result Value Ref Range    Sodium 142 136 - 145 mmol/L    Potassium 4.1 3.5 - 5.1 mmol/L    Chloride 109 (H) 98 - 107 mmol/L    CO2 27 21 - 32 mmol/L    Anion gap 6 (L) 7 - 16 mmol/L    Glucose 118 (H) 65 - 100 mg/dL    BUN 68 (H) 8 - 23 MG/DL    Creatinine 2.34 (H) 0.8 - 1.5 MG/DL    GFR est AA 36 (L) >60 ml/min/1.73m2    GFR est non-AA 30 (L) >60 ml/min/1.73m2    Calcium 8.5 8.3 - 10.4 MG/DL    Bilirubin, total 0.5 0.2 - 1.1 MG/DL    ALT (SGPT) 15 12 - 65 U/L    AST (SGOT) 17 15 - 37 U/L    Alk.  phosphatase 110 50 - 136 U/L    Protein, total 6.8 6.3 - 8.2 g/dL    Albumin 2.5 (L) 3.2 - 4.6 g/dL    Globulin 4.3 (H) 2.3 - 3.5 g/dL    A-G Ratio 0.6 (L) 1.2 - 3.5     LIPASE    Collection Time: 08/26/19  4:15 AM   Result Value Ref Range    Lipase 476 (H) 73 - 393 U/L   GLUCOSE, POC    Collection Time: 08/26/19  7:29 AM   Result Value Ref Range    Glucose (POC) 135 (H) 65 - 100 mg/dL     Allergies   Allergen Reactions    Pcn [Penicillins] Hives     Current Facility-Administered Medications Medication Dose Route Frequency Provider Last Rate Last Dose    fat emulsion 20% (LIPOSYN, INTRAlipid) infusion 250 mL  250 mL IntraVENous QPM April Gay NP   250 mL at 19 182    TPN ADULT - dextrose 5% amino acid 4.25%   IntraVENous QPM April Gay NP 85 mL/hr at 19 182      lactated Ringers infusion  50 mL/hr IntraVENous CONTINUOUS Jessica Youngblood MD        naloxone [facility]) injection 0.4 mg  0.4 mg IntraVENous PRN Casey Woo NP        ondansetron (ZOFRAN) injection 4 mg  4 mg IntraVENous Q4H PRN Casey Woo NP        promethazine (PHENERGAN) with saline injection 12.5 mg  12.5 mg IntraVENous Q4H PRN Casey Woo NP        diphenhydrAMINE (BENADRYL) injection 12.5 mg  12.5 mg IntraVENous Q4H PRN Casey Woo NP   12.5 mg at 19 0553    LORazepam (ATIVAN) injection 1 mg  1 mg IntraVENous Q6H PRN Casey Woo NP        heparin (porcine) injection 5,000 Units  5,000 Units SubCUTAneous Q8H Casey Woo NP   5,000 Units at 19 9275    insulin lispro (HUMALOG) injection   SubCUTAneous AC&HS April Gay NP   Stopped at 19 2200    pantoprazole (PROTONIX) 40 mg in sodium chloride 0.9% 10 mL injection  40 mg IntraVENous DAILY Casey Woo NP   40 mg at 19 0853    hydrALAZINE (APRESOLINE) 20 mg/mL injection 10 mg  10 mg IntraVENous Q6H PRN Casey Woo NP        morphine injection 8 mg  8 mg IntraVENous Q4H PRN Ly Rausch MD   4 mg at 19 0458       Review of Systems negative with exception of pertinent positives noted above  PHYSICAL EXAM     Visit Vitals  /83 (BP 1 Location: Right arm, BP Patient Position: At rest)   Pulse 96   Temp 98.1 °F (36.7 °C)   Resp 18   Ht 5' 6\" (1.676 m)   Wt 97 kg (213 lb 13.5 oz)   SpO2 97%   BMI 34.52 kg/m²      Temp (24hrs), Av.3 °F (36.8 °C), Min:98.1 °F (36.7 °C), Max:98.7 °F (37.1 °C)    Oxygen Therapy  O2 Sat (%): 97 % (08/26/19 1398)  Pulse via Oximetry: 96 beats per minute (08/24/19 1629)  O2 Device: Room air (08/25/19 1450)    Intake/Output Summary (Last 24 hours) at 8/26/2019 0922  Last data filed at 8/26/2019 0901  Gross per 24 hour   Intake 1336 ml   Output 985 ml   Net 351 ml      General: No acute distress    Lungs: CTA bilaterally. Resp even and non labored  Heart:  S1S2 present without murmurs rubs gallops. RRR. No LE edema  Abdomen: Soft, minimal tenderness diffuse on palpation. BS hypoactive. Non distended  Extremities: Moves ext spontaneously. No cyanosis. Neurologic:  A/O X3. No focal deficits. Results summary of Diagnostic Studies/Procedures copied from within [facility] EMR:        Dae Hansen 96 Problems    Diagnosis Date Noted    Small bowel obstruction ([facility] [address] 08/24/2019    Unspecified sleep apnea 08/24/2019     does not require a c-pap      Morbid obesity ([address] 08/24/2019     BMI 54  HAS LOST 50# POST OP July 2019 UNTIL AUGUST 24, 2019      Unintentional weight loss 08/24/2019     50# POST OP July-AUGUST 2019      Hypertension 08/24/2019    Diabetes ([address] 08/24/2019 7/2019:  PATIENT INFORMED HE DOES NOT HAVE DIABETES ANY MORE. GLUCOTROL AND METFORMIN DISCONTINUED.  Hernia of abdominal cavity 08/24/2019     POST OP UMBILICAL HERNIA 8/20 WITH RESULTANT VENTRAL HERNIA, NON INCARCERATED.  Diarrhea 08/24/2019     INTERMITTENT SINCE SURGERY 7/2019      Weakness 08/24/2019     ARMS AND LEGS MOSTLY.        Acute renal failure superimposed on stage 4 chronic kidney disease ([facility] [address] 08/24/2019     BASELINE CREATININE IN LOW 2'S  NEPHROLOGY:  YANIV LOYOLA       Intractable nausea and vomiting 08/24/2019     INTERMITTENTLY SINCE SURGERY July 2019      Abdominal pain 08/24/2019    Constipation 08/24/2019    Anemia 08/24/2019    Elevated lipase 08/24/2019    Recurrent ventral hernia 08/24/2019     Plan:    Intractable n/v, persistent SBO  Improved  Found to have persistent SBO on KUB/CT abd/pelvis  NGT to [facility]  General Surgery following  Passing flatus, minimal BM  NPO  Continue PPN  If pt needs surgery will likely need to transfer back to [facility] to his surgeon Dr. Zehra Fuentes    S/p Robotic assisted ventral hernia repair with mesh  Needs to f/u with Dr. Zehra Fuentes in [facility]    Morbid obesity  Lifestyle modifications discussed    HTN  Controlled  Continue current regimen    DM II hx  Appears resolved  A1C 5.4  He reports he does not take any medications for diabetes  SSI    Acute on chronic renal failure  Baseline 2-2.5  Creatinine trending down, at baseline currently  Continue IVF    Anemia  Multifactorial  hgb stable        Notes, labs, VS, diagnostic testing reviewed  Time spent with pt 20 min      DVT Prophylaxis: heparin sq  Plan of Care Discussed with: Supervising MD  Dr. Angel Foreman, care team, pt      Maria Guadalupe Hernandes NP

## 2019-08-26 NOTE — CONSULTS
Nephrology consulted for question of PICC line placement in patient with advanced CKD. Chart, notes, labs all reviewed. Creatinine appears to be at baseline of 2-2.3 with recent JYOTI up to 2.9. Would recommend placing a tunneled PICC line only given advanced CKD if central line access absolutely necessary. He is followed by nephrology in [facility]. Discussed with patient's primary RN.

## 2019-08-27 ENCOUNTER — APPOINTMENT (OUTPATIENT)
Dept: INTERVENTIONAL RADIOLOGY/VASCULAR | Age: 64
DRG: 389 | End: 2019-08-27
Attending: NURSE PRACTITIONER
Payer: MEDICARE

## 2019-08-27 LAB
ALBUMIN SERPL-MCNC: 2.4 G/DL (ref 3.2–4.6)
ALBUMIN/GLOB SERPL: 0.6 {RATIO} (ref 1.2–3.5)
ALP SERPL-CCNC: 96 U/L (ref 50–136)
ALT SERPL-CCNC: 12 U/L (ref 12–65)
ANION GAP SERPL CALC-SCNC: 9 MMOL/L (ref 7–16)
AST SERPL-CCNC: 13 U/L (ref 15–37)
BASOPHILS # BLD: 0 K/UL (ref 0–0.2)
BASOPHILS NFR BLD: 0 % (ref 0–2)
BILIRUB SERPL-MCNC: 0.5 MG/DL (ref 0.2–1.1)
BUN SERPL-MCNC: 58 MG/DL (ref 8–23)
CALCIUM SERPL-MCNC: 9 MG/DL (ref 8.3–10.4)
CHLORIDE SERPL-SCNC: 114 MMOL/L (ref 98–107)
CO2 SERPL-SCNC: 24 MMOL/L (ref 21–32)
CREAT SERPL-MCNC: 1.99 MG/DL (ref 0.8–1.5)
DIFFERENTIAL METHOD BLD: ABNORMAL
EOSINOPHIL # BLD: 0.3 K/UL (ref 0–0.8)
EOSINOPHIL NFR BLD: 3 % (ref 0.5–7.8)
ERYTHROCYTE [DISTWIDTH] IN BLOOD BY AUTOMATED COUNT: 15.7 % (ref 11.9–14.6)
GLOBULIN SER CALC-MCNC: 4.1 G/DL (ref 2.3–3.5)
GLUCOSE BLD STRIP.AUTO-MCNC: 145 MG/DL (ref 65–100)
GLUCOSE BLD STRIP.AUTO-MCNC: 148 MG/DL (ref 65–100)
GLUCOSE BLD STRIP.AUTO-MCNC: 162 MG/DL (ref 65–100)
GLUCOSE BLD STRIP.AUTO-MCNC: 179 MG/DL (ref 65–100)
GLUCOSE SERPL-MCNC: 136 MG/DL (ref 65–100)
HCT VFR BLD AUTO: 30.4 % (ref 41.1–50.3)
HGB BLD-MCNC: 9.3 G/DL (ref 13.6–17.2)
IMM GRANULOCYTES # BLD AUTO: 0.1 K/UL (ref 0–0.5)
IMM GRANULOCYTES NFR BLD AUTO: 1 % (ref 0–5)
LYMPHOCYTES # BLD: 1.5 K/UL (ref 0.5–4.6)
LYMPHOCYTES NFR BLD: 19 % (ref 13–44)
MCH RBC QN AUTO: 29.8 PG (ref 26.1–32.9)
MCHC RBC AUTO-ENTMCNC: 30.6 G/DL (ref 31.4–35)
MCV RBC AUTO: 97.4 FL (ref 79.6–97.8)
MONOCYTES # BLD: 1 K/UL (ref 0.1–1.3)
MONOCYTES NFR BLD: 12 % (ref 4–12)
NEUTS SEG # BLD: 5.4 K/UL (ref 1.7–8.2)
NEUTS SEG NFR BLD: 66 % (ref 43–78)
NRBC # BLD: 0 K/UL (ref 0–0.2)
PLATELET # BLD AUTO: 303 K/UL (ref 150–450)
PMV BLD AUTO: 11.6 FL (ref 9.4–12.3)
POTASSIUM SERPL-SCNC: 4.2 MMOL/L (ref 3.5–5.1)
PROT SERPL-MCNC: 6.5 G/DL (ref 6.3–8.2)
RBC # BLD AUTO: 3.12 M/UL (ref 4.23–5.6)
SODIUM SERPL-SCNC: 147 MMOL/L (ref 136–145)
WBC # BLD AUTO: 8.2 K/UL (ref 4.3–11.1)

## 2019-08-27 PROCEDURE — 77030037400 HC ADH TISS HI VISC EXOFIN CHMP -B

## 2019-08-27 PROCEDURE — 74011000250 HC RX REV CODE- 250: Performed by: NURSE PRACTITIONER

## 2019-08-27 PROCEDURE — 77030003028 HC SUT VCRL J&J -A

## 2019-08-27 PROCEDURE — 74011000250 HC RX REV CODE- 250: Performed by: RADIOLOGY

## 2019-08-27 PROCEDURE — 77030018719 HC DRSG PTCH ANTIMIC J&J -A

## 2019-08-27 PROCEDURE — C9113 INJ PANTOPRAZOLE SODIUM, VIA: HCPCS | Performed by: NURSE PRACTITIONER

## 2019-08-27 PROCEDURE — 74011636637 HC RX REV CODE- 636/637: Performed by: NURSE PRACTITIONER

## 2019-08-27 PROCEDURE — 77030002916 HC SUT ETHLN J&J -A

## 2019-08-27 PROCEDURE — 02H633Z INSERTION OF INFUSION DEVICE INTO RIGHT ATRIUM, PERCUTANEOUS APPROACH: ICD-10-PCS | Performed by: RADIOLOGY

## 2019-08-27 PROCEDURE — B244ZZZ ULTRASONOGRAPHY OF RIGHT HEART: ICD-10-PCS | Performed by: RADIOLOGY

## 2019-08-27 PROCEDURE — 85025 COMPLETE CBC W/AUTO DIFF WBC: CPT

## 2019-08-27 PROCEDURE — 74011000258 HC RX REV CODE- 258: Performed by: SURGERY

## 2019-08-27 PROCEDURE — 36415 COLL VENOUS BLD VENIPUNCTURE: CPT

## 2019-08-27 PROCEDURE — 82962 GLUCOSE BLOOD TEST: CPT

## 2019-08-27 PROCEDURE — 77001 FLUOROGUIDE FOR VEIN DEVICE: CPT

## 2019-08-27 PROCEDURE — 74011000250 HC RX REV CODE- 250: Performed by: SURGERY

## 2019-08-27 PROCEDURE — 97530 THERAPEUTIC ACTIVITIES: CPT

## 2019-08-27 PROCEDURE — C1751 CATH, INF, PER/CENT/MIDLINE: HCPCS

## 2019-08-27 PROCEDURE — 74011250636 HC RX REV CODE- 250/636: Performed by: NURSE PRACTITIONER

## 2019-08-27 PROCEDURE — 65270000029 HC RM PRIVATE

## 2019-08-27 PROCEDURE — 80053 COMPREHEN METABOLIC PANEL: CPT

## 2019-08-27 PROCEDURE — 0JH63XZ INSERTION OF TUNNELED VASCULAR ACCESS DEVICE INTO CHEST SUBCUTANEOUS TISSUE AND FASCIA, PERCUTANEOUS APPROACH: ICD-10-PCS | Performed by: RADIOLOGY

## 2019-08-27 PROCEDURE — 74011250636 HC RX REV CODE- 250/636: Performed by: SURGERY

## 2019-08-27 PROCEDURE — 97161 PT EVAL LOW COMPLEX 20 MIN: CPT

## 2019-08-27 RX ORDER — HEPARIN SODIUM (PORCINE) LOCK FLUSH IV SOLN 100 UNIT/ML 100 UNIT/ML
500 SOLUTION INTRAVENOUS ONCE
Status: ACTIVE | OUTPATIENT
Start: 2019-08-27 | End: 2019-08-27

## 2019-08-27 RX ORDER — LIDOCAINE HYDROCHLORIDE 20 MG/ML
2-20 INJECTION, SOLUTION INFILTRATION; PERINEURAL ONCE
Status: COMPLETED | OUTPATIENT
Start: 2019-08-27 | End: 2019-08-27

## 2019-08-27 RX ORDER — METOPROLOL TARTRATE 5 MG/5ML
2.5 INJECTION INTRAVENOUS EVERY 6 HOURS
Status: DISCONTINUED | OUTPATIENT
Start: 2019-08-27 | End: 2019-08-28 | Stop reason: HOSPADM

## 2019-08-27 RX ADMIN — INSULIN LISPRO 2 UNITS: 100 INJECTION, SOLUTION INTRAVENOUS; SUBCUTANEOUS at 17:49

## 2019-08-27 RX ADMIN — POTASSIUM CHLORIDE: 2 INJECTION, SOLUTION, CONCENTRATE INTRAVENOUS at 17:50

## 2019-08-27 RX ADMIN — LIDOCAINE HYDROCHLORIDE 150 MG: 20 INJECTION, SOLUTION INFILTRATION; PERINEURAL at 12:19

## 2019-08-27 RX ADMIN — SODIUM CHLORIDE 40 MG: 9 INJECTION INTRAMUSCULAR; INTRAVENOUS; SUBCUTANEOUS at 08:55

## 2019-08-27 RX ADMIN — HEPARIN SODIUM 5000 UNITS: 5000 INJECTION INTRAVENOUS; SUBCUTANEOUS at 01:37

## 2019-08-27 RX ADMIN — SOYBEAN OIL 250 ML: 20 INJECTION, SOLUTION INTRAVENOUS at 17:50

## 2019-08-27 RX ADMIN — HEPARIN SODIUM 5000 UNITS: 5000 INJECTION INTRAVENOUS; SUBCUTANEOUS at 17:50

## 2019-08-27 RX ADMIN — METOPROLOL TARTRATE 2.5 MG: 5 INJECTION INTRAVENOUS at 17:50

## 2019-08-27 RX ADMIN — METOPROLOL TARTRATE 2.5 MG: 5 INJECTION INTRAVENOUS at 23:48

## 2019-08-27 RX ADMIN — LIDOCAINE HYDROCHLORIDE 150 MG: 10; .005 INJECTION, SOLUTION EPIDURAL; INFILTRATION; INTRACAUDAL; PERINEURAL at 12:21

## 2019-08-27 NOTE — PROGRESS NOTES
Problem: Mobility Impaired (Adult and Pediatric)  Goal: *Acute Goals and Plan of Care (Insert Text)  Description  LTG:  (1.)Mr. Shefali Peña  will move from supine to sit and sit to supine in flat bed without siderails with independence within 7 day(s). (2.)Mr. Shefali Peña  will transfer from bed to chair and chair to bed with modified independence using the least restrictive/no device within 7 day(s). (3.)Mr. Shefali Peña  will ambulate with modified independence for 250+ feet with the least restrictive/no device within 7 day(s). (4.)Mr. Shefali Peña  will ambulate up/down 4 steps with no railing with CGA with cane within 7 day(s). Outcome: Progressing Towards Goal     PHYSICAL THERAPY: Initial Assessment and Daily Note 8/27/2019  INPATIENT: PT Visit Days : 1  Payor: Ana Baumann / Plan: [address] / Product Type: Managed Care Medicare /       NAME/AGE/GENDER: Vin Trimble is a 71 y.o. male   PRIMARY DIAGNOSIS: Small bowel obstruction ([address] [K56.609] Small bowel obstruction ([address]   Small bowel obstruction ([address]          ICD-10: Treatment Diagnosis:    Other abnormalities of gait and mobility (R26.89)   Precaution/Allergies:  Pcn [penicillins]      ASSESSMENT:     Mr. Shefali Peña presents sitting in chair at bedside with wife [facility]. Patient reports that at baseline he has been ambulatory with a cane recently due to R knee pain. Patient stood with modified independence and ambulated into bathroom with SBA, reaching out for walls/objects. Sat and used commode with modified independence. Patient appears to be close to baseline but has declined slightly over past couple months with 2 hospital admissions. Mr. Shefali Peña would benefit from skilled physical therapy (medically necessary) to address his deficits and maximize his function. Initiated treatment to include education in correct use of cane. Patient began ambulating holding cane in R hand.   Instructed patient to hold in L hand to assist with decreasing WBing R LE. This is his nondominant hand so feel awkward, but he was able to perform correctly with cueing. Would benefit from reinforcement and practice. This section established at most recent assessment   PROBLEM LIST (Impairments causing functional limitations):  Decreased Ambulation Ability/Technique  Decreased Activity Tolerance  Decreased [place] with Home Exercise Program   INTERVENTIONS PLANNED: (Benefits and precautions of physical therapy have been discussed with the patient.)  Balance Exercise  Bed Mobility  Gait Training  Therapeutic Activites  Therapeutic Exercise/Strengthening  education      TREATMENT PLAN: Frequency/Duration: 3 times a week for duration of hospital stay  Rehabilitation Potential For Stated Goals: Good     REHAB RECOMMENDATIONS (at time of discharge pending progress):    Placement: It is my opinion, based on this patient's performance to date, that Mr. Niall Vilchis may benefit from participating in 1-2 additional therapy sessions in order to continue to assess for rehab potential and then make recommendation for disposition at discharge. Equipment:   None at this time              HISTORY:   History of Present Injury/Illness (Reason for Referral):  Per MD note, \"Patient is a morbidly obese 71 y.o.  male who presents to ER this afternoon with an approximate 6 day history of waxing and waning diffuse, crampy abdominal pain, \"all over,\" along with intractable nausea and vomiting. Reports the worst of the pain as just under diaphragm. He has not retained solids or liquids in that time frame. He reports a very small amount semi-formed stool expelled about 3 days ago, prior to that he has had brown and yellow liquid stools in very small amounts. Rarely passing gas.   He had a lap umbilical hernia repair and a posterior lipoma resection in July at [facility] that seems to have been incarcerated, then patient describes some type of right lower quad abscess (?) aspiration 2 days post op. He reports he never had a normal BM post op and was readmitted for ten days and two days respectively for persistent SBO with questionable post op ventral hernia. Last admission 8/15-18 at [facility]. He states he felt marginally better on discharge, but when he attempted to eat again, began to vomit. He signed out AMA from the most recent admission Both admissions, he was treated conservatively with bowel rest, N/G, antiemetics and IVF. In ER, he is found with persistent SBO, N/G placed with gastric fluid return, and administered antiemetics. Additional history as noted below. \"  Past Medical History/Comorbidities:   Mr. Ravindra Christopher  has a past medical history of Arthritis, Chronic pain, CKD (chronic kidney disease) (2018), Colon polyps (2018), Hernia of abdominal cavity (08/24/2019), Hypertension, Morbid obesity ([facility] [address] Non-insulin dependent type 2 diabetes mellitus ([facility] [address] Unintentional weight loss (8/24/2019), Unspecified sleep apnea, and Ventral hernia (JULY 2019 POST OP). Mr. Ravindra Christopher  has a past surgical history that includes hx colonoscopy and hx hernia repair (07/2019). Social History/Living Environment:   Home Environment: Trailer/mobile home  # Steps to Enter: 5  Rails to Enter: No  Hand Rails : Right  Wheelchair Ramp: No  One/Two Story Residence: One story  Living Alone: No  Support Systems: Spouse/Significant Other/Partner  Patient Expects to be Discharged to[de-identified] Private residence  Current DME Used/Available at Home: Cane, straight  Tub or Shower Type: Tub/Shower combination(sponge bathes only)  Prior Level of Function/Work/Activity:  Lives with wife. Modified independent in home and community.        Number of Personal Factors/Comorbidities that affect the Plan of Care: 1-2: MODERATE COMPLEXITY   EXAMINATION:   Most Recent Physical Functioning:   Gross Assessment:  AROM: Generally decreased, functional  Strength: Generally decreased, functional  Tone: Normal  Sensation: Intact               Posture:  Posture (WDL): Exceptions to WDL  Posture Assessment: Forward head, Rounded shoulders  Balance:  Standing: Impaired  Standing - Static: Fair  Standing - Dynamic : Fair Bed Mobility:     Wheelchair Mobility:     Transfers:  Sit to Stand: Modified independent  Stand to Sit: Modified independent  Gait:     Base of Support: Center of gravity altered  Speed/Allie: Slow  Step Length: Left shortened;Right shortened  Distance (ft): 50 Feet (ft)(x2)  Ambulation - Level of Assistance: Stand-by assistance  Interventions: Safety awareness training;Verbal cues      Body Structures Involved:  Digestive Structures Body Functions Affected:  Sensory/Pain  Movement Related  Digestive Activities and Participation Affected: [address], Social and [place]   Number of elements that affect the Plan of Care: 4+: HIGH COMPLEXITY   CLINICAL PRESENTATION:   Presentation: Evolving clinical presentation with changing clinical characteristics: MODERATE COMPLEXITY   CLINICAL DECISION MAKIN [facility] Inpatient Short Form  How much difficulty does the patient currently have. .. Unable A Lot A Little None   1. Turning over in bed (including adjusting bedclothes, sheets and blankets)? ? 1   ? 2   ? 3   ? 4   2. Sitting down on and standing up from a chair with arms ( e.g., wheelchair, bedside commode, etc.)   ? 1   ? 2   ? 3   ? 4   3. Moving from lying on back to sitting on the side of the bed?   ? 1   ? 2   ? 3   ? 4   How much help from another person does the patient currently need. .. Total A Lot A Little None   4. Moving to and from a bed to a chair (including a wheelchair)? ? 1   ? 2   ? 3   ? 4   5. Need to walk in hospital room? ? 1   ? 2   ? 3   ? 4   6. Climbing 3-5 steps with a railing? ? 1   ? 2   ? 3   ? 4   © 2007, Trustees of [address] [place] [address] [address] [address], under license to [employer].  All rights reserved Score:  Initial: 20 Most Recent: X (Date: -- )    Interpretation of Tool:  Represents activities that are increasingly more difficult (i.e. Bed mobility, Transfers, Gait). Medical Necessity:     Patient is expected to demonstrate progress in balance and functional technique   to increase independence with   and improve safety during all functional mobility. .  Reason for Services/Other Comments:  Patient continues to require skilled intervention due to medical complications and decline in functional mobility over time. .   Use of outcome tool(s) and clinical judgement create a POC that gives a: Clear prediction of patient's progress: LOW COMPLEXITY            TREATMENT:   (In addition to Assessment/Re-Assessment sessions the following treatments were rendered)   Pre-treatment Symptoms/Complaints:  none. Pain: Initial:   Pain Intensity 1: 4  Pain Location 1: Knee  Pain Orientation 1: Right  Pain Intervention(s) 1: Ambulation/Increased Activity, Repositioned  Post Session:  4     Therapeutic Activity: (    8 minutes): Therapeutic activities including Ambulation on level ground and LE AROM  to improve mobility. Required minimal Safety awareness training;Verbal cues to promote correct sequencing with cane . Braces/Orthotics/Lines/Etc:   IV  nasogastric tube  O2 Device: Room air  Treatment/Session Assessment:    Response to Treatment:  pleasant and cooperative. Interdisciplinary Collaboration:   Physical Therapist  Registered Nurse  After treatment position/precautions:   Up in chair  Bed/Chair-wheels locked  Call light within reach  RN notified  Family at bedside   Compliance with Program/Exercises: Will assess as treatment progresses  Recommendations/Intent for next treatment session: \"Next visit will focus on advancements to more challenging activities and reduction in assistance provided\".   Total Treatment Duration:  PT Patient Time In/Time Out  Time In: 1035  Time Out: Yoav 107, PT, DPT

## 2019-08-27 NOTE — PROGRESS NOTES
TRANSFER - OUT REPORT:    Verbal report given to [facility], RN on Titus Oh  being transferred to 2nd floor for routine post - op       Report consisted of patients Situation, Background, Assessment and   Recommendations(SBAR). Information from the following report(s) SBAR, Kardex, Procedure Summary and MAR was reviewed with the receiving nurse. Lines:   Double Lumen 08/27/19 Right Subclavian (Active)       Peripheral IV 08/25/19 Anterior;Distal;Right Forearm (Active)   Site Assessment Clean, dry, & intact 8/27/2019  7:00 AM   Phlebitis Assessment 0 8/27/2019  7:00 AM   Infiltration Assessment 0 8/27/2019  7:00 AM   Dressing Status Clean, dry, & intact 8/27/2019  7:00 AM   Dressing Type Tape;Transparent 8/27/2019  7:00 AM   Hub Color/Line Status Infusing 8/27/2019  7:00 AM   Alcohol Cap Used No 8/26/2019 11:50 PM        Opportunity for questions and clarification was provided.

## 2019-08-27 NOTE — PROGRESS NOTES
ERAS End of Shift    * No surgery found *     Pagan: No    Bowel Movement: No    Bowel Sounds: hypoactive    DIET NPO     Tolerating Diet?: Yes    Ambulated 2 times. Up to chair for 2 meals.     Lidocaine: No    PRN Pain Medications Used?: No    IS Used: Yes    Ideal body weight: 63.8 kg (140 lb 10.5 oz)     Signed By: Zak Will RN     August 27, 2019

## 2019-08-27 NOTE — PROCEDURES
Department of Interventional Radiology  [phone]        Interventional Radiology Brief Procedure Note    Patient: Michael Brennan MRN: [id number]  SSN: [id number]    YOB: 1955  Age: 71 y.o. Sex: male      Date of Procedure: 8/27/2019    Pre-Procedure Diagnosis: Needs TPN    Post-Procedure Diagnosis: SAME    Procedure(s): Tunneled Central Venous Catheter    Brief Description of Procedure: as above    Performed By: Phillip Anders MD     Assistants: None    Anesthesia:Lidocaine    Estimated Blood Loss: Less than 10ml    Specimens:  None    Implants:  Tunnelled Central Venous Catheter    Findings: Patent right IJ    Complications: None    Recommendations: OK to use     Follow Up: as needed.   Call when it can be [address]    Signed By: Phillip Anders MD     August 27, 2019

## 2019-08-27 NOTE — PROGRESS NOTES
H&P/Consult Note/Progress Note/Office Note:   Michael Mota  MRN: [id number]  :1955  Age:64 y.o.    HPI: Michael Mota is a 71 y.o. male with h/o CHF, obesity, DM, CKD who recently had surgery at [facility].    19 s/p robotic assisted 8cm ventral hernia repair with 4.5cm x 11.4cm Ventralight STmesh at [facility]   (and benign lipoma resection from back) by Dr Hansa Sandoval ([phone])   DIAGNOSIS: BENIGN FATTY TISSUE, CONSISTENT WITH LIPOMA. Sita Price M.D.  (Electronically Signed)  He describes having a seroma aspirated by Dr Arabella Gilbert post-op. 8/15/19 He was admitted to [facility] with recurrent hernia and SBO and followed by surgery there with NGT  He did not have surgery and was discharged after approx 2 days    8/15/19 CT abd/pelvis at [facility]  IMPRESSION:  [address]UM[address], SEEN ON THE PREVIOUS STUDY OF 2019, WITHOUT  BOWEL CONTENT BUT WITH SIGNIFICANT PERITONEAL FAT CONTENT, POSSIBLY RELATED TO  THE SMALL BOWEL OBSTRUCTION    INCIDENTALLY OBSERVED IS UNCOMPLICATED CHOLELITHIASIS    MODERATE BILATERAL RENAL ATROPHY      He [facility] the [facility] ER on 19 with progressive, constant, severe N/V/abd for >1 week. Abd pain involved upper abdomen and was generalized and non-radiating  CT is shown below with recurrent SBO  Nothing made symptoms better or worse. No associated fevers  He was admitted by Hospitalists  Gen Surgery was consulted by Dr Durga Rivera. Kelsy Mejia who called our service. NGT placed and palcement confirmed on abd Xray        19 CT abd/pelvis with oral but no IV contrast  Hx: Abd pain, generalized, with vomiting. Cough. 50 pound weight loss in 1month      FINDINGS: There is minimal bibasilar atelectasis.     CT ABD: No contour deforming abnormality of the liver. Multiple low-density  lesions seen within the spleen. Gallstones present in the gallbladder.  The  adrenal glands and pancreas are normal. No renal or ureteral calculi  demonstrated. There are multiple dilated loops of small bowel measuring up to 6  cm. It is difficult to identify a zone of transition, but multiple decompressed  loops of small bowel are present in the right lower quadrant, and there is  colonic decompression present as well. There is a fluid-filled supraumbilical  abdominal wall hernia present, midline.     CT PELVIS: The bladder and rectum are normal. No pelvic adenopathy demonstrated. Small pelvic free fluid present.     Bone window evaluation demonstrates no aggressive osseous lesions.       IMPRESSION:  1. Findings compatible with small bowel obstruction with multiple dilated loops  of small bowel seen within the upper abdomen. It is difficult to identify a zone  of transition, but multiple decompressed loops of small bowel are present in the  right lower quadrant. 2. Fluid-containing supraumbilical ventral abdominal wall hernia. 3. Cholelithiasis. 4. Multiple low-density lesions within the spleen which cannot be further  characterized on this exam. Correlation with targeted sonography, as an  outpatient on a nonemergent basis, recommended for further evaluation      He previously had colonoscopy at [facility] as shown below. 10/25/18 s/p colonoscopy at [facility]; Dr Idalia Rosa MD--->  \"Normal\" ; no specimens; next planned in 5yrs      Additional hx:  8/25/19 no new issues; sleeping  8/26/19 +flatus and BM; abd softer  8/27/19 +more flatus; pain issues much less and better daily            Past Medical History:   Diagnosis Date    Arthritis     Chronic pain     arthritic    CKD (chronic kidney disease) 2018    BASELINE CREATININE IN LOW 2'S    Colon polyps 2018    Hernia of abdominal cavity 10/89/7336    POST OP UMBILICAL HERNIA 6/66 WITH RESULTANT VENTRAL HERNIA, NON INCARCERATED.      Hypertension     ON MEDS    Morbid obesity ([address]     BMI 47    Non-insulin dependent type 2 diabetes mellitus ([address]     WAS TOLD HE DID NOT HAVE DM ANY MORE IN [place] [address] OF 2019. UNKNOWN A1 C    Unintentional weight loss 8/24/2019    50# POST OP July-AUGUST 2019    Unspecified sleep apnea     does not require a c-pap    Ventral hernia JULY 2019 POST OP     Past Surgical History:   Procedure Laterality Date    HX COLONOSCOPY      HX HERNIA REPAIR  07/2019     Current Facility-Administered Medications   Medication Dose Route Frequency    TPN ADULT - dextrose 5% amino acid 4.25%   IntraVENous QPM    lidocaine 4 % patch 1 Patch  1 Patch TransDERmal Q24H    HYDROcodone-acetaminophen (NORCO) 5-325 mg per tablet 1 Tab  1 Tab Oral Q4H PRN    morphine injection 2 mg  2 mg IntraVENous Q4H PRN    fat emulsion 20% (LIPOSYN, INTRAlipid) infusion 250 mL  250 mL IntraVENous QPM    naloxone (NARCAN) injection 0.4 mg  0.4 mg IntraVENous PRN    ondansetron (ZOFRAN) injection 4 mg  4 mg IntraVENous Q4H PRN    promethazine (PHENERGAN) with saline injection 12.5 mg  12.5 mg IntraVENous Q4H PRN    diphenhydrAMINE (BENADRYL) injection 12.5 mg  12.5 mg IntraVENous Q4H PRN    LORazepam (ATIVAN) injection 1 mg  1 mg IntraVENous Q6H PRN    heparin (porcine) injection 5,000 Units  5,000 Units SubCUTAneous Q8H    insulin lispro (HUMALOG) injection   SubCUTAneous AC&HS    pantoprazole (PROTONIX) 40 mg in sodium chloride 0.9% 10 mL injection  40 mg IntraVENous DAILY    hydrALAZINE (APRESOLINE) 20 mg/mL injection 10 mg  10 mg IntraVENous Q6H PRN     Pcn [penicillins]  Social History     Socioeconomic History    Marital status:      Spouse name: Not on file    Number of children: Not on file    Years of education: Not on file    Highest education level: Not on file   Tobacco Use    Smoking status: Never Smoker    Smokeless tobacco: Never Used   Substance and Sexual Activity    Alcohol use: No    Drug use: No   Social History Narrative    8/24:  PATIENT IS RETIRED, LIVES WITH WIFE PHAM. ([phone]). HE DOES NOT HAVE ANY CHILDREN.       Social History     Tobacco Use Smoking Status Never Smoker   Smokeless Tobacco Never Used     Family History   Problem Relation Age of Onset    Diabetes Mother     Cancer Sister     Stroke Sister     Heart Disease Sister      ROS: The patient has no difficulty with chest pain or shortness of breath. No fever or chills. Comprehensive review of systems was otherwise unremarkable except as noted above. Physical Exam:   Visit Vitals  /88   Pulse 98   Temp 98.6 °F (37 °C)   Resp 18   Ht 5' 6\" (1.676 m)   Wt 213 lb 13.5 oz (97 kg)   SpO2 94%   BMI 34.52 kg/m²     Vitals:    08/26/19 1457 08/26/19 1949 08/26/19 2256 08/27/19 0245   BP: 160/85 165/84 165/87 155/88   Pulse: 95 93 95 98   Resp: 18 18 18 18   Temp: 98.1 °F (36.7 °C) 98.6 °F (37 °C) 98.8 °F (37.1 °C) 98.6 °F (37 °C)   SpO2: 94% 97% 97% 94%   Weight:       Height:         No intake/output data recorded. 08/25 1901 - 08/27 0700  In: 1699 [I.V.:1699]  Out: 3650 [Urine:2975]      Constitutional: Alert, oriented, cooperative patient in no acute distress; appears stated age    Eyes:Sclera are clear. EOMs intact  ENMT: no external lesions gross hearing normal; no obvious neck masses, no ear or lip lesions, nares normal; +NGT  CV: RRR. Normal perfusion  Resp: No JVD. Breathing is  non-labored; no audible wheezing. GI: obese, healed trochar sites; cannot palpate hernia but his obesity limits exam; Upper abdomen is much less tender; No guarding or rebound      Musculoskeletal: unremarkable with normal function. No embolic signs or cyanosis.    Neuro:  Oriented; moves all 4; no focal deficits  Psychiatric: normal affect and mood, no memory impairment    Recent vitals (if inpt):  Patient Vitals for the past 24 hrs:   BP Temp Pulse Resp SpO2   08/27/19 0245 155/88 98.6 °F (37 °C) 98 18 94 %   08/26/19 2256 165/87 98.8 °F (37.1 °C) 95 18 97 %   08/26/19 1949 165/84 98.6 °F (37 °C) 93 18 97 %   08/26/19 1457 160/85 98.1 °F (36.7 °C) 95 18 94 %   08/26/19 1105 155/87 98.1 °F (36.7 °C) 94 18 97 %       Labs:  Recent Labs     08/27/19  0419 08/26/19  0415  08/24/19  1724   WBC 8.2 8.6   < >  --    HGB 9.3* 9.3*   < >  --     351   < >  --    * 142   < >  --    K 4.2 4.1   < >  --    * 109*   < >  --    CO2 24 27   < >  --    BUN 58* 68*   < >  --    CREA 1.99* 2.34*   < >  --    * 118*   < >  --    PTP  --   --   --  13.2   INR  --   --   --  1.0   TBILI 0.5 0.5  --   --    SGOT 13* 17  --   --    ALT 12 15  --   --    AP 96 110  --   --    LPSE  --  476*   < >  --     < > = values in this interval not displayed. Lab Results   Component Value Date/Time    WBC 8.2 08/27/2019 04:19 AM    HGB 9.3 (L) 08/27/2019 04:19 AM    PLATELET 732 53/92/2526 04:19 AM    Sodium 147 (H) 08/27/2019 04:19 AM    Potassium 4.2 08/27/2019 04:19 AM    Chloride 114 (H) 08/27/2019 04:19 AM    CO2 24 08/27/2019 04:19 AM    BUN 58 (H) 08/27/2019 04:19 AM    Creatinine 1.99 (H) 08/27/2019 04:19 AM    Glucose 136 (H) 08/27/2019 04:19 AM    INR 1.0 08/24/2019 05:24 PM    Bilirubin, total 0.5 08/27/2019 04:19 AM    AST (SGOT) 13 (L) 08/27/2019 04:19 AM    ALT (SGPT) 12 08/27/2019 04:19 AM    Alk. phosphatase 96 08/27/2019 04:19 AM    Lipase 476 (H) 08/26/2019 04:15 AM       CT Results  (Last 48 hours)    None        chest X-ray      I reviewed recent labs, recent radiologic studies, and pertinent records including other doctor notes if needed. I independently reviewed radiology images for studies I described above or studies I have ordered.    Admission date (for inpatients): 8/24/2019   * No surgery found *  * No surgery found *    ASSESSMENT/PLAN:  Problem List  Date Reviewed: 8/24/2019          Codes Class Noted    * (Principal) Small bowel obstruction ([facility] ICD-10-CM: A08.227  ICD-9-CM: 560.9  8/24/2019        Unspecified sleep apnea (Chronic) ICD-10-CM: G47.30  ICD-9-CM: 780.57  8/24/2019    Overview Signed 8/24/2019  4:31 PM by Armaan Vuong NP     does not require a c-pap Morbid obesity ([facility] (Chronic) ICD-10-CM: E66.01  ICD-9-CM: 278.01  8/24/2019    Overview Signed 8/24/2019  4:32 PM by Jann Jones NP     BMI 54  HAS LOST 50# POST OP July 2019 UNTIL AUGUST 24, 2019             Unintentional weight loss ICD-10-CM: R63.4  ICD-9-CM: 783.21  8/24/2019    Overview Signed 8/24/2019  4:33 PM by Jann Jones NP     50# POST OP July-AUGUST 2019             Hypertension (Chronic) ICD-10-CM: I10  ICD-9-CM: 401.9  8/24/2019        Diabetes ([facility] (Chronic) ICD-10-CM: E11.9  ICD-9-CM: 250.00  8/24/2019    Overview Signed 8/24/2019  4:34 PM by Jann Jones NP     7/2019:  PATIENT INFORMED HE DOES NOT HAVE DIABETES ANY MORE. GLUCOTROL AND METFORMIN DISCONTINUED. Hernia of abdominal cavity (Chronic) ICD-10-CM: K46.9  ICD-9-CM: 553.9  8/24/2019    Overview Signed 8/24/2019  4:35 PM by Jann Jones NP     POST OP UMBILICAL HERNIA 6/97 WITH RESULTANT VENTRAL HERNIA, NON INCARCERATED. Diarrhea ICD-10-CM: R19.7  ICD-9-CM: 787.91  8/24/2019    Overview Signed 8/24/2019  4:36 PM by Jann Jones NP     INTERMITTENT SINCE SURGERY 7/2019             Weakness ICD-10-CM: R53.1  ICD-9-CM: 780.79  8/24/2019    Overview Signed 8/24/2019  4:36 PM by Jann Jones NP     ARMS AND LEGS MOSTLY.               Acute renal failure superimposed on stage 4 chronic kidney disease ([facility] ICD-10-CM: N17.9, N18.4  ICD-9-CM: 584.9, 585.4  8/24/2019    Overview Signed 8/24/2019  4:37 PM by Jann Jones NP     BASELINE CREATININE IN LOW 2'S  NEPHROLOGY:  YANIV IN NIR              Intractable nausea and vomiting ICD-10-CM: R11.2  ICD-9-CM: 536.2  8/24/2019    Overview Signed 8/24/2019  4:37 PM by Jann Jones NP     INTERMITTENTLY [facility] SURGERY July 2019             Abdominal pain (Chronic) ICD-10-CM: R10.9  ICD-9-CM: 789.00  8/24/2019        Constipation ICD-10-CM: K59.00  ICD-9-CM: 564.00  8/24/2019        Anemia ICD-10-CM: D64.9  ICD-9-CM: 285.9  8/24/2019        Elevated lipase ICD-10-CM: R74.8  ICD-9-CM: 790.5  8/24/2019        Recurrent ventral hernia ICD-10-CM: K43.2  ICD-9-CM: 553.21  8/24/2019            Principal Problem:    Small bowel obstruction ([facility] (8/24/2019)    Active Problems:    Unspecified sleep apnea (8/24/2019)      Overview: does not require a c-pap      Morbid obesity ([address] (8/24/2019)      Overview: BMI 54      HAS LOST 50# POST OP July 2019 UNTIL AUGUST 24, 2019      Unintentional weight loss (8/24/2019)      Overview: 50# POST OP July-AUGUST 2019      Hypertension (8/24/2019)      Diabetes ([facility] (8/24/2019)      Overview: 7/2019:  PATIENT INFORMED HE DOES NOT HAVE DIABETES ANY MORE. GLUCOTROL       AND METFORMIN DISCONTINUED. Hernia of abdominal cavity (8/24/2019)      Overview: POST OP UMBILICAL HERNIA 1/13 WITH RESULTANT VENTRAL HERNIA, NON       INCARCERATED. Diarrhea (8/24/2019)      Overview: INTERMITTENT SINCE SURGERY 7/2019      Weakness (8/24/2019)      Overview: ARMS AND LEGS MOSTLY.        Acute renal failure superimposed on stage 4 chronic kidney disease ([facility] (8/24/2019)      Overview: BASELINE CREATININE IN LOW 2'S      NEPHROLOGY:  YANIV IN [place]       Intractable nausea and vomiting (8/24/2019)      Overview: INTERMITTENTLY SINCE SURGERY July 2019      Abdominal pain (8/24/2019)      Constipation (8/24/2019)      Anemia (8/24/2019)      Elevated lipase (8/24/2019)      Recurrent ventral hernia (8/24/2019)         Recurrent SBO  He is improving  +flatus and BM on 8/25/19 and flatus on 8/26/19  Exam better    Recent robotic ventral hernia surgery with large piece of mesh at [facility] by Dr Parviz Dimas  He may have a mesh complication with bowel involvement  I told pt that I do not perform robotic ventral hernia repair  NPO/IVF  Bowel rest  NGT-->LISx    Plan repeat CT without oral and without IV contrast tomorrow on Wednesday 8/28/19        Possible hernia recurrence vs post-op seroma on CT  Suspect this is only a seroma and not a recurrent hernia based on hx and images  Follow    Renal Insufficiency  Unsure of baseline as we have no old labs  He thinks baseline Cr approx 2.5  Cr now <2  Continue hydration   Daily labs    Morbid obesity  Encourage weight loss              I have personally performed a face-to-face diagnostic evaluation and management  service on this patient. I have independently seen the patient. I have independently obtained the above history from the patient/family. I have independently examined the patient with above findings. I have independently reviewed data/labs for this patient and developed the above plan of care (MDM). Signed: Indio Da Silva.  Sergey Lundberg MD, FACS

## 2019-08-27 NOTE — PROGRESS NOTES
Spoke to Mr. Danelle Sofia and his wife in room 202 about Case Management and discharge planning. They live in [facility], and prior to July 2019, he was independent with ADLs. He is now NPO with NG tube to LIS and on TPN. However, Mr. Danelle Sofia states \"I'm leaving tomorrow\" (he has a history of leaving AMA from [facility] in [place], [place]). Case Management to followup later and re-discuss discharge planning. Mr. Melissa Davis, however, is home. Care Management Interventions  Current Support Network: Lives with Spouse, Own Home  Plan discussed with Pt/Family/Caregiver:  Yes

## 2019-08-27 NOTE — PROGRESS NOTES
Nutrition F/U  TPN management per nutritional support protocols. (Vannessa Hamilton, SHAW)  Assessment:   Diet orders: 8-24: NPO    Central line placed in IR today. Remains TPN dependent d/t recurrent SBO. NGT to LIS with 700 ml of output yesterday. Na 147, Cl 114-current PPN contains 70 meq NaCl/L. May benefit from omission of NaCl from TPN and/or addition of IVF  POC Glucoses:  113-162 mg/dl; received 2 units SSI yesterday. H/O DM. May zahra insulin added to TPN as CHO load is increased from TPN. Macronutrient needs:(IBW 64.5 kg)  EER:  6681-0008 kcal /day (20-25 kcal/kg IBW)  EPR:  52-84 grams protein/day (0.8-1.3 grams/kg IBW)-anabolism in CKD,   Max CHO:  372 grams/day (4mg/kg IBW/min)  Fluid:  1ml/kcal - or per MD  Intake/Comparative Standards: Current PPN:  5%DEX/ 4.25%AA at 85 ml/hr with 250 ml 20% Lipids daily provides 1180 kcal/d (91% of needs), 85 grams of protein/d (100% of needs, ~1.3 gm PRO/kgIBW), 100 grams of CHO/d (does not exceed maximum CHO load) and 2250 ml of total volume/d (100% of needs)  Intervention:   Meals and snacks: NPO  Nutritional Supplement Therapy: Electrolyte replacement per nutritional support protocols are active on MAR. PN:   1. Change TPN to 2L/d of 10%DEX/ 4.25%AA at 85 ml/hr with 250 ml 20% Lipids daily provides 1520 kcal/d (100% of needs), 85 grams of protein/d (100% of needs, ~1.3 gm PRO/kgIBW), 200 grams of CHO/d (does not exceed maximum CHO load) and 2250 ml of total volume/d (100% of needs)  2.  Additive changes: Decrease NaCl to 40 meq/L    Jaleesa Avila, RD, LD, [facility] [phone]

## 2019-08-28 ENCOUNTER — APPOINTMENT (OUTPATIENT)
Dept: CT IMAGING | Age: 64
DRG: 389 | End: 2019-08-28
Attending: SURGERY
Payer: MEDICARE

## 2019-08-28 VITALS
RESPIRATION RATE: 18 BRPM | HEIGHT: 66 IN | HEART RATE: 109 BPM | SYSTOLIC BLOOD PRESSURE: 126 MMHG | BODY MASS INDEX: 34.55 KG/M2 | TEMPERATURE: 98.5 F | DIASTOLIC BLOOD PRESSURE: 92 MMHG | WEIGHT: 215 LBS | OXYGEN SATURATION: 97 %

## 2019-08-28 LAB
ALBUMIN SERPL-MCNC: 2.5 G/DL (ref 3.2–4.6)
ALBUMIN/GLOB SERPL: 0.6 {RATIO} (ref 1.2–3.5)
ALP SERPL-CCNC: 93 U/L (ref 50–136)
ALT SERPL-CCNC: 7 U/L (ref 12–65)
ANION GAP SERPL CALC-SCNC: 4 MMOL/L (ref 7–16)
AST SERPL-CCNC: 13 U/L (ref 15–37)
BACTERIA SPEC CULT: NORMAL
BACTERIA SPEC CULT: NORMAL
BASOPHILS # BLD: 0 K/UL (ref 0–0.2)
BASOPHILS NFR BLD: 0 % (ref 0–2)
BILIRUB SERPL-MCNC: 0.3 MG/DL (ref 0.2–1.1)
BUN SERPL-MCNC: 55 MG/DL (ref 8–23)
CALCIUM SERPL-MCNC: 8.8 MG/DL (ref 8.3–10.4)
CHLORIDE SERPL-SCNC: 119 MMOL/L (ref 98–107)
CO2 SERPL-SCNC: 23 MMOL/L (ref 21–32)
CREAT SERPL-MCNC: 1.88 MG/DL (ref 0.8–1.5)
DIFFERENTIAL METHOD BLD: ABNORMAL
EOSINOPHIL # BLD: 0.2 K/UL (ref 0–0.8)
EOSINOPHIL NFR BLD: 3 % (ref 0.5–7.8)
ERYTHROCYTE [DISTWIDTH] IN BLOOD BY AUTOMATED COUNT: 16.1 % (ref 11.9–14.6)
GLOBULIN SER CALC-MCNC: 4.3 G/DL (ref 2.3–3.5)
GLUCOSE BLD STRIP.AUTO-MCNC: 152 MG/DL (ref 65–100)
GLUCOSE BLD STRIP.AUTO-MCNC: 169 MG/DL (ref 65–100)
GLUCOSE BLD STRIP.AUTO-MCNC: 207 MG/DL (ref 65–100)
GLUCOSE SERPL-MCNC: 131 MG/DL (ref 65–100)
HCT VFR BLD AUTO: 29.8 % (ref 41.1–50.3)
HGB BLD-MCNC: 9.5 G/DL (ref 13.6–17.2)
IMM GRANULOCYTES # BLD AUTO: 0.1 K/UL (ref 0–0.5)
IMM GRANULOCYTES NFR BLD AUTO: 1 % (ref 0–5)
LYMPHOCYTES # BLD: 1.7 K/UL (ref 0.5–4.6)
LYMPHOCYTES NFR BLD: 19 % (ref 13–44)
MAGNESIUM SERPL-MCNC: 2.1 MG/DL (ref 1.8–2.4)
MCH RBC QN AUTO: 32 PG (ref 26.1–32.9)
MCHC RBC AUTO-ENTMCNC: 31.9 G/DL (ref 31.4–35)
MCV RBC AUTO: 100.3 FL (ref 79.6–97.8)
MONOCYTES # BLD: 0.9 K/UL (ref 0.1–1.3)
MONOCYTES NFR BLD: 9 % (ref 4–12)
NEUTS SEG # BLD: 6.3 K/UL (ref 1.7–8.2)
NEUTS SEG NFR BLD: 69 % (ref 43–78)
NRBC # BLD: 0 K/UL (ref 0–0.2)
PHOSPHATE SERPL-MCNC: 1.8 MG/DL (ref 2.3–3.7)
PLATELET # BLD AUTO: 301 K/UL (ref 150–450)
PMV BLD AUTO: 12.6 FL (ref 9.4–12.3)
POTASSIUM SERPL-SCNC: 4.4 MMOL/L (ref 3.5–5.1)
PROT SERPL-MCNC: 6.8 G/DL (ref 6.3–8.2)
RBC # BLD AUTO: 2.97 M/UL (ref 4.23–5.6)
SERVICE CMNT-IMP: NORMAL
SERVICE CMNT-IMP: NORMAL
SODIUM SERPL-SCNC: 146 MMOL/L (ref 136–145)
WBC # BLD AUTO: 9.2 K/UL (ref 4.3–11.1)

## 2019-08-28 PROCEDURE — 85025 COMPLETE CBC W/AUTO DIFF WBC: CPT

## 2019-08-28 PROCEDURE — 74011000250 HC RX REV CODE- 250: Performed by: NURSE PRACTITIONER

## 2019-08-28 PROCEDURE — 83735 ASSAY OF MAGNESIUM: CPT

## 2019-08-28 PROCEDURE — C9113 INJ PANTOPRAZOLE SODIUM, VIA: HCPCS | Performed by: NURSE PRACTITIONER

## 2019-08-28 PROCEDURE — 80053 COMPREHEN METABOLIC PANEL: CPT

## 2019-08-28 PROCEDURE — 84100 ASSAY OF PHOSPHORUS: CPT

## 2019-08-28 PROCEDURE — 74011250636 HC RX REV CODE- 250/636: Performed by: NURSE PRACTITIONER

## 2019-08-28 PROCEDURE — 82962 GLUCOSE BLOOD TEST: CPT

## 2019-08-28 PROCEDURE — 74176 CT ABD & PELVIS W/O CONTRAST: CPT

## 2019-08-28 RX ORDER — INSULIN LISPRO 100 [IU]/ML
INJECTION, SOLUTION INTRAVENOUS; SUBCUTANEOUS
Status: DISCONTINUED | OUTPATIENT
Start: 2019-08-28 | End: 2019-08-28 | Stop reason: HOSPADM

## 2019-08-28 RX ORDER — HEPARIN 100 UNIT/ML
300 SYRINGE INTRAVENOUS AS NEEDED
Status: DISCONTINUED | OUTPATIENT
Start: 2019-08-28 | End: 2019-08-28 | Stop reason: HOSPADM

## 2019-08-28 RX ADMIN — HEPARIN SODIUM 5000 UNITS: 5000 INJECTION INTRAVENOUS; SUBCUTANEOUS at 01:36

## 2019-08-28 RX ADMIN — METOPROLOL TARTRATE 2.5 MG: 5 INJECTION INTRAVENOUS at 12:22

## 2019-08-28 RX ADMIN — SODIUM CHLORIDE 40 MG: 9 INJECTION INTRAMUSCULAR; INTRAVENOUS; SUBCUTANEOUS at 09:02

## 2019-08-28 RX ADMIN — HYDRALAZINE HYDROCHLORIDE 10 MG: 20 INJECTION INTRAMUSCULAR; INTRAVENOUS at 01:36

## 2019-08-28 RX ADMIN — METOPROLOL TARTRATE 2.5 MG: 5 INJECTION INTRAVENOUS at 05:03

## 2019-08-28 RX ADMIN — HEPARIN SODIUM 5000 UNITS: 5000 INJECTION INTRAVENOUS; SUBCUTANEOUS at 09:02

## 2019-08-28 NOTE — PROGRESS NOTES
Patient encouraged to use incentive spirometer. Demonstrated to this primary RN correct use of IS. Patient education provided on benefits of IS use while in hospital. Patient verbalized understanding.

## 2019-08-28 NOTE — PROGRESS NOTES
Nutrition F/U  TPN management per nutritional support protocols. (Bk León, SHAW)  Assessment:   Diet orders: 8-24: NPO    Central line:  Double lumen subclavian. Remains TPN dependent d/t recurrent SBO. Repeat CT today. NGT to LIS with 500 ml of output yesterday. AM lab draw results erroneous (more relfective of TPN solution than serum sample)   RN reports labs redrawn but no results available at this time. POC Glucoses:  145-179 mg/dl; received 2 units SSI yesterday. However SSI stopped by Hospitalist yesterday H/O DM. Macronutrient needs:(IBW 64.5 kg)  EER:  7627-5715 kcal /day (20-25 kcal/kg IBW)  EPR:  52-84 grams protein/day (0.8-1.3 grams/kg IBW)-anabolism in CKD,   Max CHO:  372 grams/day (4mg/kg IBW/min)  Fluid:  1ml/kcal - or per MD  Intake/Comparative Standards: Current TPN:  2L/d of 10%DEX/ 4.25%AA at 85 ml/hr with 250 ml 20% Lipids daily provides 1520 kcal/d (100% of needs), 85 grams of protein/d (100% of needs, ~1.3 gm PRO/kgIBW), 200 grams of CHO/d (does not exceed maximum CHO load) and 2250 ml of total volume/d (100% of needs)  Intervention:   Meals and snacks: NPO  Nutritional Supplement Therapy: Electrolyte replacement per nutritional support protocols are active on MAR. PN: Continue current TPN. Resume SSI.     Suzi Remy, [address] [place] [address], [facility] [address] [place] [address], [address] [place] [address]

## 2019-08-28 NOTE — PROGRESS NOTES
Pt and wife demanding to go home. Informed them that I spoke with Dr. Wilfredo Macias as he was going in OR and he would like to talk with them between [phone] today when he is out of OR. Pt and wife are both adamant that they are going home now \"you can not  keep me here against my will\"  CD with CT scan imaging given to patient and wife per Dr. Mayda Ramos request.  Mehreen Wolff NP at desk, aware if situation.

## 2019-08-28 NOTE — PROGRESS NOTES
H&P/Consult Note/Progress Note/Office Note:   Sheryle Kling  MRN: [id number]  :1955  Age:64 y.o.    HPI: Sheryle Kling is a 71 y.o. male with h/o CHF, obesity, DM, CKD who recently had surgery at [facility].    19 s/p robotic assisted 8cm ventral hernia repair with 4.5cm x 11.4cm Ventralight STmesh at [facility]   (and benign lipoma resection from back) by Dr Isabel Waters ([phone])   DIAGNOSIS: BENIGN FATTY TISSUE, CONSISTENT WITH LIPOMA. Reena Benson M.D.  (Electronically Signed)  He describes having a seroma aspirated by Dr Balbir Tadeo post-op. 8/15/19 He was admitted to [facility] with recurrent hernia and SBO and followed by surgery there with NGT  He did not have surgery and was discharged after approx 2 days    8/15/19 CT abd/pelvis at [facility]  IMPRESSION:  [address] PER[address]UM[address]IL[address], SEEN ON THE PREVIOUS STUDY OF 2019, WITHOUT  BOWEL CONTENT BUT WITH SIGNIFICANT PERITONEAL FAT CONTENT, POSSIBLY RELATED TO  THE SMALL BOWEL OBSTRUCTION    INCIDENTALLY OBSERVED IS UNCOMPLICATED CHOLELITHIASIS    MODERATE BILATERAL RENAL ATROPHY      He came to the [facility] ER on 19 with progressive, constant, severe N/V/abd for >1 week. Abd pain involved upper abdomen and was generalized and non-radiating  CT is shown below with recurrent SBO  Nothing made symptoms better or worse. No associated fevers  He was admitted by Hospitalists  Gen Surgery was consulted by Dr Nicole Mccormick. Kaylan James who called our service. NGT placed and palcement confirmed on abd Xray        19 CT abd/pelvis with oral but no IV contrast  Hx: Abd pain, generalized, with vomiting. Cough. 50 pound weight loss in 1month      FINDINGS: There is minimal bibasilar atelectasis.     CT ABD: No contour deforming abnormality of the liver. Multiple low-density  lesions seen within the spleen. Gallstones present in the gallbladder.  The  adrenal glands and pancreas are normal. No renal or ureteral calculi  demonstrated. There are multiple dilated loops of small bowel measuring up to 6  cm. It is difficult to identify a zone of transition, but multiple decompressed  loops of small bowel are present in the right lower quadrant, and there is  colonic decompression present as well. There is a fluid-filled supraumbilical  abdominal wall hernia present, midline.     CT PELVIS: The bladder and rectum are normal. No pelvic adenopathy demonstrated. Small pelvic free fluid present.     Bone window evaluation demonstrates no aggressive osseous lesions.       IMPRESSION:  1. Findings compatible with small bowel obstruction with multiple dilated loops  of small bowel seen within the upper abdomen. It is difficult to identify a zone  of transition, but multiple decompressed loops of small bowel are present in the  right lower quadrant. 2. Fluid-containing supraumbilical ventral abdominal wall hernia. 3. Cholelithiasis. 4. Multiple low-density lesions within the spleen which cannot be further  characterized on this exam. Correlation with targeted sonography, as an  outpatient on a nonemergent basis, recommended for further evaluation      He previously had colonoscopy at [facility] as shown below. 10/25/18 s/p colonoscopy at [facility]; Dr Wesly Marrero MD--->  \"Normal\" ; no specimens; next planned in 5yrs      Additional hx:  8/25/19 no new issues; sleeping  8/26/19 +flatus and BM; abd softer  8/27/19 +more flatus; pain issues much less and better daily  8/28/19  +more flatus; last BM 8/26/19;  CT today           Past Medical History:   Diagnosis Date    Arthritis     Chronic pain     arthritic    CKD (chronic kidney disease) 2018    BASELINE CREATININE IN LOW 2'S    Colon polyps 2018    Hernia of abdominal cavity 62/28/4193    POST OP UMBILICAL HERNIA 8/15 WITH RESULTANT VENTRAL HERNIA, NON INCARCERATED.      Hypertension     ON MEDS    Morbid obesity ([address]     BMI 47    Non-insulin dependent type 2 diabetes mellitus ([facility]     WAS TOLD HE DID NOT HAVE DM ANY MORE IN JULY OF 2019.   UNKNOWN A1 C    Unintentional weight loss 8/24/2019    50# POST OP July-AUGUST 2019    Unspecified sleep apnea     does not require a c-pap    Ventral hernia JULY 2019 POST OP     Past Surgical History:   Procedure Laterality Date    HX COLONOSCOPY      HX HERNIA REPAIR  07/2019    IR INSERT TUNL CVC W/O PORT OVER 5 YR  8/27/2019     Current Facility-Administered Medications   Medication Dose Route Frequency    TPN ADULT - dextrose 10% amino acid 4.25%   IntraVENous QPM    fat emulsion 20% (LIPOSYN, INTRAlipid) infusion 250 mL  250 mL IntraVENous QPM    metoprolol (LOPRESSOR) injection 2.5 mg  2.5 mg IntraVENous Q6H    lidocaine 4 % patch 1 Patch  1 Patch TransDERmal Q24H    HYDROcodone-acetaminophen (NORCO) 5-325 mg per tablet 1 Tab  1 Tab Oral Q4H PRN    morphine injection 2 mg  2 mg IntraVENous Q4H PRN    naloxone (NARCAN) injection 0.4 mg  0.4 mg IntraVENous PRN    ondansetron (ZOFRAN) injection 4 mg  4 mg IntraVENous Q4H PRN    promethazine (PHENERGAN) with saline injection 12.5 mg  12.5 mg IntraVENous Q4H PRN    diphenhydrAMINE (BENADRYL) injection 12.5 mg  12.5 mg IntraVENous Q4H PRN    LORazepam (ATIVAN) injection 1 mg  1 mg IntraVENous Q6H PRN    heparin (porcine) injection 5,000 Units  5,000 Units SubCUTAneous Q8H    pantoprazole (PROTONIX) 40 mg in sodium chloride 0.9% 10 mL injection  40 mg IntraVENous DAILY    hydrALAZINE (APRESOLINE) 20 mg/mL injection 10 mg  10 mg IntraVENous Q6H PRN     Pcn [penicillins]  Social History     Socioeconomic History    Marital status:      Spouse name: Not on file    Number of children: Not on file    Years of education: Not on file    Highest education level: Not on file   Tobacco Use    Smoking status: Never Smoker    Smokeless tobacco: Never Used   Substance and Sexual Activity    Alcohol use: No    Drug use: No   Social History Narrative    8/24:  PATIENT IS RETIRED, [address] [place] [address] ([phone]). HE DOES NOT HAVE ANY CHILDREN. Social History     Tobacco Use   Smoking Status Never Smoker   Smokeless Tobacco Never Used     Family History   Problem Relation Age of Onset    Diabetes Mother     Cancer Sister     Stroke Sister     Heart Disease Sister      ROS: The patient has no difficulty with chest pain or shortness of breath. No fever or chills. Comprehensive review of systems was otherwise unremarkable except as noted above. Physical Exam:   Visit Vitals  /90   Pulse 100   Temp 99.2 °F (37.3 °C)   Resp 18   Ht 5' 6\" (1.676 m)   Wt 215 lb (97.5 kg)   SpO2 97%   BMI 34.70 kg/m²     Vitals:    08/28/19 0132 08/28/19 0244 08/28/19 0437 08/28/19 0503   BP: (!) 193/97 161/88  152/90   Pulse: 94 (!) 101  100   Resp: 18 18     Temp: 99.3 °F (37.4 °C) 99.2 °F (37.3 °C)     SpO2: 94% 97%     Weight:   215 lb (97.5 kg)    Height:         No intake/output data recorded. 08/26 1901 - 08/28 0700  In: 3025 [I.V.:3025]  Out: 1975 [Urine:1250]      Constitutional: Alert, oriented, cooperative patient in no acute distress; appears stated age    Eyes:Sclera are clear. EOMs intact  ENMT: no external lesions gross hearing normal; no obvious neck masses, no ear or lip lesions, nares normal; +NGT  CV: RRR. Normal perfusion  Resp: No JVD. Breathing is  non-labored; no audible wheezing. GI: obese, healed trochar sites; cannot palpate hernia but his obesity limits exam; Upper abdomen is non-tender; No guarding or rebound      Musculoskeletal: unremarkable with normal function. No embolic signs or cyanosis.    Neuro:  Oriented; moves all 4; no focal deficits  Psychiatric: normal affect and mood, no memory impairment    Recent vitals (if inpt):  Patient Vitals for the past 24 hrs:   BP Temp Pulse Resp SpO2 Weight   08/28/19 0503 152/90  100      08/28/19 0437      215 lb (97.5 kg)   08/28/19 0244 161/88 99.2 °F (37.3 °C) (!) 101 18 97 %    08/28/19 0132 (!) 193/97 99.3 °F (37.4 °C) 94 18 94 %    08/27/19 2349  99.8 °F (37.7 °C)       08/27/19 2341 183/82 100.1 °F (37.8 °C) (!) 101 18 98 %    08/27/19 2148 161/88 99.3 °F (37.4 °C) 100 18 98 %    08/27/19 2004 (!) 164/94 99.2 °F (37.3 °C) 100 16 100 %    08/27/19 1612   (!) 105      08/27/19 1513 131/81 98.3 °F (36.8 °C) (!) 111 16 98 %    08/27/19 1414 151/87 98.7 °F (37.1 °C) (!) 102 15 99 %    08/27/19 1226   99 16 98 %    08/27/19 1122 174/78 98.8 °F (37.1 °C) (!) 103 16 98 %    08/27/19 0747 161/89 98.6 °F (37 °C) 92 16 99 %        Labs:  Recent Labs     08/28/19  0356  08/26/19  0415   WBC 9.2   < > 8.6   HGB 9.5*   < > 9.3*      < > 351      < > 142   K 5.1   < > 4.1   *   < > 109*   CO2 22   < > 27   BUN 50*   < > 68*   CREA 1.90*   < > 2.34*   *   < > 118*   TBILI 0.5   < > 0.5   SGOT 18   < > 17   ALT 13   < > 15   AP 84   < > 110   LPSE  --   --  476*    < > = values in this interval not displayed. Lab Results   Component Value Date/Time    WBC 9.2 08/28/2019 03:56 AM    HGB 9.5 (L) 08/28/2019 03:56 AM    PLATELET 728 47/18/5429 03:56 AM    Sodium 137 08/28/2019 03:56 AM    Potassium 5.1 08/28/2019 03:56 AM    Chloride 112 (H) 08/28/2019 03:56 AM    CO2 22 08/28/2019 03:56 AM    BUN 50 (H) 08/28/2019 03:56 AM    Creatinine 1.90 (H) 08/28/2019 03:56 AM    Glucose 603 (HH) 08/28/2019 03:56 AM    INR 1.0 08/24/2019 05:24 PM    Bilirubin, total 0.5 08/28/2019 03:56 AM    AST (SGOT) 18 08/28/2019 03:56 AM    ALT (SGPT) 13 08/28/2019 03:56 AM    Alk. phosphatase 84 08/28/2019 03:56 AM    Lipase 476 (H) 08/26/2019 04:15 AM       CT Results  (Last 48 hours)    None        chest X-ray      I reviewed recent labs, recent radiologic studies, and pertinent records including other doctor notes if needed. I independently reviewed radiology images for studies I described above or studies I have ordered.    Admission date (for inpatients): 8/24/2019   * No surgery found * * No surgery found *    ASSESSMENT/PLAN:  Problem List  Date Reviewed: 8/24/2019          Codes Class Noted    * (Principal) Small bowel obstruction ([facility] ICD-10-CM: Y64.299  ICD-9-CM: 560.9  8/24/2019        Unspecified sleep apnea (Chronic) ICD-10-CM: G47.30  ICD-9-CM: 780.57  8/24/2019    Overview Signed 8/24/2019  4:31 PM by Shahana Dunn NP     does not require a c-pap             Morbid obesity ([facility] (Chronic) ICD-10-CM: E66.01  ICD-9-CM: 278.01  8/24/2019    Overview Signed 8/24/2019  4:32 PM by Shahana Dunn NP     BMI 54  HAS LOST 50# POST OP July 2019 UNTIL AUGUST 24, 2019             Unintentional weight loss ICD-10-CM: R63.4  ICD-9-CM: 783.21  8/24/2019    Overview Signed 8/24/2019  4:33 PM by Shahana Dunn NP     50# POST OP July-AUGUST 2019             Hypertension (Chronic) ICD-10-CM: I10  ICD-9-CM: 401.9  8/24/2019        Diabetes ([facility] (Chronic) ICD-10-CM: E11.9  ICD-9-CM: 250.00  8/24/2019    Overview Signed 8/24/2019  4:34 PM by Shahana Dunn NP     7/2019:  PATIENT INFORMED HE DOES NOT HAVE DIABETES ANY MORE. GLUCOTROL AND METFORMIN DISCONTINUED. Hernia of abdominal cavity (Chronic) ICD-10-CM: K46.9  ICD-9-CM: 553.9  8/24/2019    Overview Signed 8/24/2019  4:35 PM by Shahana Dunn NP     POST OP UMBILICAL HERNIA 7/36 WITH RESULTANT VENTRAL HERNIA, NON INCARCERATED. Diarrhea ICD-10-CM: R19.7  ICD-9-CM: 787.91  8/24/2019    Overview Signed 8/24/2019  4:36 PM by Shahana Dunn NP     INTERMITTENT SINCE SURGERY 7/2019             Weakness ICD-10-CM: R53.1  ICD-9-CM: 780.79  8/24/2019    Overview Signed 8/24/2019  4:36 PM by Shahana Dunn NP     ARMS AND LEGS MOSTLY.               Acute renal failure superimposed on stage 4 chronic kidney disease ([facility] ICD-10-CM: N17.9, N18.4  ICD-9-CM: 584.9, 585.4  8/24/2019    Overview Signed 8/24/2019  4:37 PM by Shahana Dunn NP     BASELINE CREATININE IN LOW 2'S  NEPHROLOGY:  DAN IN [place]              Intractable nausea and vomiting ICD-10-CM: R11.2  ICD-9-CM: 536.2  8/24/2019    Overview Signed 8/24/2019  4:37 PM by Kateryna Gutierres NP     INTERMITTENTLY [facility] SURGERY July 2019             Abdominal pain (Chronic) ICD-10-CM: R10.9  ICD-9-CM: 789.00  8/24/2019        Constipation ICD-10-CM: K59.00  ICD-9-CM: 564.00  8/24/2019        Anemia ICD-10-CM: D64.9  ICD-9-CM: 285.9  8/24/2019        Elevated lipase ICD-10-CM: R74.8  ICD-9-CM: 790.5  8/24/2019        Recurrent ventral hernia ICD-10-CM: K43.2  ICD-9-CM: 553.21  8/24/2019            Principal Problem:    Small bowel obstruction ([address] (8/24/2019)    Active Problems:    Unspecified sleep apnea (8/24/2019)      Overview: does not require a c-pap      Morbid obesity ([address] (8/24/2019)      Overview: BMI 54      HAS LOST 50# POST OP July 2019 UNTIL AUGUST 24, 2019      Unintentional weight loss (8/24/2019)      Overview: 50# POST OP July-AUGUST 2019      Hypertension (8/24/2019)      Diabetes ([address] (8/24/2019)      Overview: 7/2019:  PATIENT INFORMED HE DOES NOT HAVE DIABETES ANY MORE. GLUCOTROL       AND METFORMIN DISCONTINUED. Hernia of abdominal cavity (8/24/2019)      Overview: POST OP UMBILICAL HERNIA 6/52 WITH RESULTANT VENTRAL HERNIA, NON       INCARCERATED. Diarrhea (8/24/2019)      Overview: INTERMITTENT SINCE SURGERY 7/2019      Weakness (8/24/2019)      Overview: ARMS AND LEGS MOSTLY.        Acute renal failure superimposed on stage 4 chronic kidney disease ([address] (8/24/2019)      Overview: BASELINE CREATININE IN [facility] 2'S      NEPHROLOGY:  YANIV IN NIR       Intractable nausea and vomiting (8/24/2019)      Overview: INTERMITTENTLY SINCE SURGERY July 2019      Abdominal pain (8/24/2019)      Constipation (8/24/2019)      Anemia (8/24/2019)      Elevated lipase (8/24/2019)      Recurrent ventral hernia (8/24/2019)         Recurrent SBO  He is improving  +flatus and BM on 8/25/19 and flatus on 8/26/19  Exam better  Repeat CT planned today    Recent robotic ventral hernia surgery with large piece of mesh at [facility] by Dr Boyd Rank  He may have a mesh complication with bowel involvement  I told pt that I do not perform robotic ventral hernia repair  NPO/IVF  Bowel rest  NGT-->LISx    Plan repeat CT without oral and without IV contrast today on Wednesday 8/28/19        Possible hernia recurrence vs post-op seroma on CT  Suspect this is only a seroma and not a recurrent hernia based on hx and images  Follow    Renal Insufficiency  Unsure of baseline as we have no old labs  He thinks baseline Cr approx 2.5  Cr now <2  Continue hydration   Daily labs    Morbid obesity  Encourage weight loss    Uncontrolled HTN  Defer to nephrology and Int Med              I have personally performed a face-to-face diagnostic evaluation and management  service on this patient. I have independently seen the patient. I have independently obtained the above history from the patient/family. I have independently examined the patient with above findings. I have independently reviewed data/labs for this patient and developed the above plan of care (MDM). Signed: Chloé Martinez.  Daylin Rowe MD, FACS

## 2019-08-28 NOTE — PROGRESS NOTES
Hourly rounds completed this shift. Patient resting in bed. No needs stated at this time. Labs redrawn due to error. Will continue to monitor and give report to oncoming RN.

## 2019-08-28 NOTE — PROGRESS NOTES
PT note:  Treatment deferred as patient refused to participate. He states that the MD told him that he could go home today, he did the x-ray early this morning and has not heard anything from it, just wants to go home. Patient is with NG tube that is still on suction. RN made aware of the conversation with the patient . Will continue PT efforts.   Ghanshyam Kirk, PTA

## 2019-08-28 NOTE — PROGRESS NOTES
Pt belligerent in room. This RN to assess situation. Pt and family irate that his \"line ain't came out yet. \"  Explained to patient and family that the tunneled cath can only be taken out in IR. Family does not understand why he has to wait and they cannot just do it now. Tayo Reich, NP at desk, she has also spoken to family about situation along with Momo Root, . Explained to patient and family that leaving with the line in place is a huge risk for infection and further complication. Patient still wishes to leave AMA at this time.   AMA signed by patient and Dr. Lana Alberto.

## 2019-08-29 ENCOUNTER — HOSPITAL ENCOUNTER (EMERGENCY)
Age: 64
Discharge: HOME | End: 2019-08-29
Attending: EMERGENCY MEDICINE
Payer: MEDICARE

## 2019-08-29 VITALS
TEMPERATURE: 98.1 F | SYSTOLIC BLOOD PRESSURE: 108 MMHG | BODY MASS INDEX: 34.55 KG/M2 | OXYGEN SATURATION: 99 % | HEIGHT: 66 IN | HEART RATE: 109 BPM | WEIGHT: 215 LBS | DIASTOLIC BLOOD PRESSURE: 69 MMHG | RESPIRATION RATE: 20 BRPM

## 2019-08-29 DIAGNOSIS — Z51.89 VISIT FOR WOUND CHECK: Primary | ICD-10-CM

## 2019-08-29 PROCEDURE — 99283 EMERGENCY DEPT VISIT LOW MDM: CPT | Performed by: PHYSICIAN ASSISTANT

## 2019-08-29 NOTE — DISCHARGE INSTRUCTIONS
Patient Education   Wound Care: After Your Visit to the Emergency Room  Your Care Instructions  The care you need depends on the type of wound you have. Taking good care of your wound at home will help it heal quickly and will reduce your chance of infection. Even though you have been released from the emergency room, you still need to watch for any problems. The doctor carefully checked you. But sometimes problems can develop later. If you have new symptoms, or if your symptoms do not get better, return to the emergency room or call your doctor right away. A visit to the emergency room is only one step in your treatment. Even if you feel better, you still need to do what your doctor recommends, such as going to all suggested follow-up appointments and taking medicines exactly as directed. This will help you recover and help prevent future problems. How can you care for yourself at home? · Clean the area with soap and water 2 times a day, or as your doctor tells you. Don't use hydrogen peroxide or alcohol, which can slow healing. ¨ Unless your doctor gives you other directions, cover the wound with a thin layer of antibiotic ointment, such as bacitracin, and a bandage. Do not use an ointment that contains neomycin, because it can irritate the skin. ¨ Apply more ointment and replace the bandage as your doctor tells you. ¨ If the bandage is stuck to a scab, soak it in warm water to soften the scab. This will make the bandage easier to remove. · Ask your doctor if you can take an over-the-counter pain medicine. Do not take two or more pain medicines at the same time unless the doctor told you to. · Some pain is normal with a wound, but do not ignore pain that is getting worse instead of better. You could have an infection. · Your doctor may have closed your wound with stitches (sutures), staples, or skin glue. ¨ If you have stitches, your doctor may remove them after several days to 2 weeks.  Or you may have stitches that dissolve on their own. ¨ If you have staples, your doctor may remove them after 7 to 10 days. ¨ If your wound was closed with skin glue, the glue will wear off in a few days to 2 weeks. When should you call for help? Return to the emergency room now if:  · You have signs of infection, such as:  ¨ Increased pain, swelling, warmth, or redness around the wound. ¨ Red streaks leading from the wound. ¨ Pus draining from the wound. ¨ Swollen lymph nodes in your neck, armpits, or groin. ¨ A fever. · The wound starts to bleed, and blood soaks through the bandage. (Oozing small amounts of blood is normal.)  Call your doctor today if:  · The wound is not getting better each day. Where can you learn more? Go to [employer]  Enter P907 in the search box to learn more about \"Wound Care: After Your Visit to the Emergency Room. \"   © 3227-5006 [employer], [employer]. Care instructions adapted under license by [facility] (which disclaims liability or warranty for this information). This care instruction is for use with your licensed healthcare professional. If you have questions about a medical condition or this instruction, always ask your healthcare professional. [place] [address] any warranty or liability for your use of this information.   Content Version: 9.3.77620; Last Revised: July 22, 2010

## 2019-08-29 NOTE — ED TRIAGE NOTES
Pt reports that he left the hospital AMA yesterday - pt noted to have central line to his right upper chest area - dressing noted 8/27 placed

## 2019-08-29 NOTE — DISCHARGE SUMMARY
Hospitalist Discharge Summary     Admit Date:  2019 11:17 AM   Name:  Dasha Gtaes   Age:  71 y.o.  :  1955   MRN:  [id number]   PCP:  Ramos Valdez MD  Treatment Team: Hospitalist: Gilmer Amaro MD; Hospitalist: Zena Fernandes NP; Consulting Provider: Sonny Clay MD; Utilization Review: Jeanne Dalton RN; Care Manager: Michael Bennett RN; Consulting Provider: Jeanmarie Bauer MD; Primary Nurse: Dee Brooks RN; Physical Therapy Assistant: Piter Pelayo PTA    PROBLEMS:    Intractable nausea and vomiting, intermittent diarrhea post op with history of SBO   Abdominal pain due to SBO:  Improved   Persistent, recurrent post op Small bowel obstruction  S/P robotic assisted ventral hernia repair with mesh placement and benign lipoma removal from back 2019 at [facility]  Concern for mesh complication with bowel involvement. Possible hernia recurrence vs post op seroma on CT, suspect seroma  Unspecified sleep apnea  Elevated lipase:  resolved   Morbid obesity with BMI 54  Unintentional 50# weight loss post op  Hypertension on meds  History of NIDDM:  A1C: 5.4    Generalized weakness: refused PT/OT  Acute on chronic renal failure, stage 3-4:  Baseline creatinine in low 2's: Improved   Anemia  Extreme medical noncompliance with hostility: this is the third time he has left hospital AMA for the same problem.      Hospital Course:  Patient presented to ER  afternoon with an approximate 6 day history of waxing and waning diffuse, crampy abdominal pain, \"all over,\" along with intractable nausea and vomiting.  Reports the worst of the pain as just under diaphragm.  He has not retained solids or liquids in that time frame.  He reports a very small amount semi-formed stool expelled about 3 days ago, prior to that he has had brown and yellow liquid stools in very small amounts.  Rarely passing gas. He is extremely obese.  Had an 8 cm lap, robotic  ventral hernia repair with mesh placement and a benign lipoma removed from his back Tyson Quiroz that seems to have been incarcerated, then patient describes some type of right lower quad abscess/seroma (?) with aspiration 2 days post op. [facility] reports he never had a normal BM post op and was readmitted for ten days and two days respectively for persistent SBO with questionable post op ventral hernia. Last admission 8/15-18 at [facility] report as noted below.  He states he felt marginally better on discharge, but when he attempted to eat again, began to vomit. [facility] signed out AMA from both admissions. Extensive old records review indicate extreme noncompliance with treatments and medical direction throughout the entire process.  During both admissions, he was treated conservatively with bowel rest, N/G, antiemetics and IVF.  Has not had any additional surgery.  In ER, he is found with persistent SBO, N/G placed with gastric fluid return, and administered antiemetics. Surgery, Dr Asha Cortez, in for consult 8/24 while patient in ER with recommendations to continue conservative care with hospitalist admission. Dr Asha Cortez also informs them patient may have a mesh complication with bowel involvement and that he does not perform robotic ventral hernia repair. Placed on total bowel rest with IVF and N/G to LIS with large amounts brownish green gastric fluid evacuated on placement. Wife and patient loudly protest plan of care, but agree to admission for reasons unknown. Lipase up to 508 on admission. 8/25:  Dr Asha Cortez suspects seroma. Wife continues to loudly complain about all aspects of care. Multiple attempts per staff to explain plan met with resistance mostly by wife with patient secondarily agreeing. Neither of them seem to understand that N/G, IV and watchful waiting is a treatment. Patient reports feeling a little better today. Few bowel sounds noted.  Some watery stool and small piece of formed stool expelled. Abdomen less tense. Plans to repeat CT 8/28. Wife with patient in agreement threatening to leave if patient is not operated on. 8/26:  Passing some gas. Small BM again. Abdomen more soft. With wife antagonistic at bedside, both demanding he have surgery immediately despite multiple attempts on my and additional caregivers parts to explain why surgery is not indicated at this juncture. They both refuse to listen/understand that he has not ever fully completed his past two hospital courses or treatment. They are also informed this practice could potentially be life threatening. Wife is consistently rude and hostile, attempting to direct his care. To the best of my knowledge, she has not left the patient's room since he was admitted. No additional family members seen by me to reason with.   8/27: Tachy to 115 with history of same. Home coreg dosing and nifedipine begun. TPN begun after central line placed. Patient and wife a little less agitated and hostile today. Patient convinced he is leaving tomorrow to go have surgery with primary surgeon at [facility]. States he has an appointment with primary surgeon 8/29. Refuses to believe otherwise. Explained repeatedly there is not a quick fix. Surgery with plans for CT tomorrow. Refusing to participate in PT. 8/28:  Dr Sindhu Jack in, patient, encouraged by wife, still threatening to leave. Finally agrees to wait for CT this afternoon with subsequent visit from Dr Sindhu Jack. As the day progresses, wife and patient become more agitated, demanding to see Dr Sindhu Jack despite staff explaining he is in surgery and will be in afterward. CT completed, both are becoming loudly vocal, demanding staff remove central line. Staff attempts to explain this procedure must be done in IR. IR notified, will call for patient as soon as possible. Patient threatening to remove line himself. Informed him this could be life threatening.  Multiple staff members in to speak with patient and wife. Refusing PT, wife will not get out of chair for patient to sit. At about 1430, both demanding to go home, states \" you cant keep me here against my will. \"  Dr Sergey Lundberg in surgery, informed, requests copy of CT be given to patient, same done per staff. Couple leaves floor after IR unable to remove line at this moment as demanded. Tunneled cath intact. Again explained self removal could have dire consequences. Dr Flores Trujillo here to assist. AMA form signed prior to leaving against advice. Disposition: Left Against Medical Advice  Activity: Activity as tolerated  Diet: NPO at the time of self discharge  Code Status:  Full  Patient and wife refused discharge instructions. No Rx given. Diagnostic Imaging/Tests:   8/24:  CXR: No acute findings     8/24:  CT ABDOMEN AND PELVIS: Findings compatible with small bowel obstruction with multiple dilated loops of small bowel seen within the upper abdomen. It is difficult to identify a zone of transition, but multiple decompressed loops of small bowel are present in the right lower quadrant. Fluid-containing supraumbilical ventral abdominal wall hernia.  Cholelithiasis. Multiple low-density lesions within the spleen which cannot be further characterized on this exam. Correlation with targeted sonography, as an outpatient on a nonemergent basis, recommended for further evaluation.     8/24:  KUB: Alisa Dine is an NG tube present to the right of midline, likely in the distal stomach. There is marked dilation of loops of small bowel.  Findings suspicious for obstruction.     8/24:  KUB: Alisa Dine is an NG tube which terminates in the stomach. Dilated loops of small bowel present.       8/28:  CT ABDOMEN AND PELVIS:  INTERVAL DECREASE IN SMALL BOWEL DILATATION SINCE AUGUST 24 WITH A NASOGASTRIC TUBE IN PLACE. ETIOLOGY AND DISCRETE SITE OF SUSPECTED SMALL BOWEL OBSTRUCTION REMAIN UNCERTAIN.   STABLE FLUID-FILLED SUPRAUMBILICAL HERNIA.   CHOLELITHIASIS    MICRO:   8/25: STOOL CULTURE:  NGTD  8/25:  C DIF: Negative  8/25:  URINE CULTURE: 50,000-100,000 colonies/ml mixed skin jaimee    Labs: Results:       BMP, Mg, Phos Recent Labs     08/28/19  0608 08/27/19 0419   * 147*   K 4.4 4.2   * 114*   CO2 23 24   AGAP 4* 9   BUN 55* 58*   CREA 1.88* 1.99*   CA 8.8 9.0   * 136*   MG 2.1  --    PHOS 1.8*  --       CBC Recent Labs     08/28/19  0356 08/27/19 0419   WBC 9.2 8.2   RBC 2.97* 3.12*   HGB 9.5* 9.3*   HCT 29.8* 30.4*    303   GRANS 69 66   LYMPH 19 19   EOS 3 3   MONOS 9 12   BASOS 0 0   IG 1 1   ANEU 6.3 5.4   ABL 1.7 1.5   ARELIS 0.2 0.3   ABM 0.9 1.0   ABB 0.0 0.0   AIG 0.1 0.1      LFT Recent Labs     08/28/19  0608 08/27/19 0419   SGOT 13* 13*   ALT 7* 12   AP 93 96   TP 6.8 6.5   ALB 2.5* 2.4*   GLOB 4.3* 4.1*   AGRAT 0.6* 0.6*      Cardiac Testing Lab Results   Component Value Date/Time    BNP 38 (H) 08/24/2019 11:25 AM      Coagulation Tests Lab Results   Component Value Date/Time    Prothrombin time 13.2 08/24/2019 05:24 PM    INR 1.0 08/24/2019 05:24 PM      A1c Lab Results   Component Value Date/Time    Hemoglobin A1c 5.4 08/24/2019 05:24 PM      Lipid Panel Lab Results   Component Value Date/Time    Triglyceride 176 (H) 08/26/2019 04:15 AM      Most Recent UA Lab Results   Component Value Date/Time    Color YELLOW 08/25/2019 07:02 PM    Appearance CLEAR 08/25/2019 07:02 PM    Specific gravity 1.012 08/25/2019 07:02 PM    pH (UA) 6.0 08/25/2019 07:02 PM    Protein 100 (A) 08/25/2019 07:02 PM    Glucose NEGATIVE  08/25/2019 07:02 PM    Ketone TRACE (A) 08/25/2019 07:02 PM    Bilirubin SMALL (A) 08/25/2019 07:02 PM    Blood NEGATIVE  08/25/2019 07:02 PM    Urobilinogen 0.2 08/25/2019 07:02 PM    Nitrites NEGATIVE  08/25/2019 07:02 PM    Leukocyte Esterase NEGATIVE  08/25/2019 07:02 PM    WBC 0-3 08/25/2019 07:02 PM    RBC 0-3 08/25/2019 07:02 PM    Epithelial cells 0-3 08/25/2019 07:02 PM    Bacteria 0 08/25/2019 07:02 PM    Casts 0 08/25/2019 07:02 PM    Crystals, urine 0 08/25/2019 07:02 PM    Mucus 0 08/25/2019 07:02 PM        Allergies   Allergen Reactions    Pcn [Penicillins] Hives     Immunization History   Administered Date(s) Administered    TB Skin Test (PPD) Intradermal 08/24/2019     All Labs from Last 24 Hrs:  Recent Results (from the past 24 hour(s))   METABOLIC PANEL, COMPREHENSIVE    Collection Time: 08/28/19  6:08 AM   Result Value Ref Range    Sodium 146 (H) 136 - 145 mmol/L    Potassium 4.4 3.5 - 5.1 mmol/L    Chloride 119 (H) 98 - 107 mmol/L    CO2 23 21 - 32 mmol/L    Anion gap 4 (L) 7 - 16 mmol/L    Glucose 131 (H) 65 - 100 mg/dL    BUN 55 (H) 8 - 23 MG/DL    Creatinine 1.88 (H) 0.8 - 1.5 MG/DL    GFR est AA 47 (L) >60 ml/min/1.73m2    GFR est non-AA 38 (L) >60 ml/min/1.73m2    Calcium 8.8 8.3 - 10.4 MG/DL    Bilirubin, total 0.3 0.2 - 1.1 MG/DL    ALT (SGPT) 7 (L) 12 - 65 U/L    AST (SGOT) 13 (L) 15 - 37 U/L    Alk. phosphatase 93 50 - 136 U/L    Protein, total 6.8 6.3 - 8.2 g/dL    Albumin 2.5 (L) 3.2 - 4.6 g/dL    Globulin 4.3 (H) 2.3 - 3.5 g/dL    A-G Ratio 0.6 (L) 1.2 - 3.5     MAGNESIUM    Collection Time: 08/28/19  6:08 AM   Result Value Ref Range    Magnesium 2.1 1.8 - 2.4 mg/dL   PHOSPHORUS    Collection Time: 08/28/19  6:08 AM   Result Value Ref Range    Phosphorus 1.8 (L) 2.3 - 3.7 MG/DL   GLUCOSE, POC    Collection Time: 08/28/19  7:12 AM   Result Value Ref Range    Glucose (POC) 169 (H) 65 - 100 mg/dL   GLUCOSE, POC    Collection Time: 08/28/19  8:03 AM   Result Value Ref Range    Glucose (POC) 152 (H) 65 - 100 mg/dL   GLUCOSE, POC    Collection Time: 08/28/19 11:43 AM   Result Value Ref Range    Glucose (POC) 207 (H) 65 - 100 mg/dL       Current Outpatient Medications   Medication Sig    carvedilol (COREG) 12.5 mg tablet Take 12.5 mg by mouth two (2) times daily (with meals).  furosemide (LASIX) 40 mg tablet Take 40 mg by mouth as needed (USUALLY TAKES 2X PER WEEK AS OF JULY 2019.   WAS TAKING 80 MG DAILY UNTIL THEN).    ergocalciferol, vitamin D2, (CALCIDOL PO) Take 0.25 mg by mouth daily.  NIFEdipine ER (NIFEDICAL XL) 60 mg ER tablet Take 60 mg by mouth daily.  omeprazole (PRILOSEC) 20 mg capsule Take 20 mg by mouth daily.  methocarbamol (ROBAXIN) 500 mg tablet Take 500 mg by mouth three (3) times daily as needed for Other (MUSCLE SPASM).  ferrous sulfate (IRON) 325 mg (65 mg iron) cpER Take 1 Tab by mouth nightly.  sodium bicarbonate 650 mg tablet Take 650 mg by mouth four (4) times daily.  allopurinol (ZYLOPRIM) 300 mg tablet Take 300 mg by mouth daily.  selenium 200 mcg cap Take 1 Cap by mouth every seven (7) days.  cyanocobalamin 1,000 mcg tablet Take 1,000 mcg by mouth daily.  GINSENG PO Take 250 mg by mouth two (2) times a week.  ubidecarenone/vitamin E mixed (COQ10  PO) Take 200 mg by mouth every seven (7) days.  colchicine 0.6 mg tablet Take 0.6 mg by mouth daily as needed.  aspirin 81 mg chewable tablet Take 81 mg by mouth daily. Holding for surgery     Discharge Exam:  Patient Vitals for the past 24 hrs:   Temp Pulse Resp BP SpO2   08/28/19 1227 98.5 °F (36.9 °C) (!) 109 18 (!) 126/92 97 %   08/28/19 0803 99.6 °F (37.6 °C) (!) 111 18 (!) 168/96 99 %   08/28/19 0503  100  152/90      Oxygen Therapy  O2 Sat (%): 97 % (08/28/19 1227)  Pulse via Oximetry: 96 beats per minute (08/24/19 1629)  O2 Device: Room air (08/27/19 1226)    Intake/Output Summary (Last 24 hours) at 8/29/2019 0435  Last data filed at 8/28/2019 0503  Gross per 24 hour   Intake    Output 200 ml   Net -200 ml       Discharge Info:  AS ABOVE ONLY    Follow Up Orders:  PATIENT STATES HE WILL SEE PRIMARY SURGEON IN \[facility]\"" Time spent in patient discharge planning and coordination 45 minutes.     Signed:  Jose A Cooper NP

## 2019-08-29 NOTE — ED NOTES
I have reviewed discharge instructions with the patient. The patient verbalized understanding. Patient left ED via Discharge Method: ambulatory to Home with self    Opportunity for questions and clarification provided. Patient given 0 scripts. To continue your aftercare when you leave the hospital, you may receive an automated call from our care team to check in on how you are doing. This is a free service and part of our promise to provide the best care and service to meet your aftercare needs.  If you have questions, or wish to unsubscribe from this service please call [phone]. Thank you for Choosing our [facility] Emergency Department.

## 2019-08-29 NOTE — ED PROVIDER NOTES
Patient is here for central line removal.  He was admitted to this hospital for a small bowel obstruction on August 24 of 2019 and left AGAINST MEDICAL ADVICE yesterday. He was told the central line needed to be removed by interventional radiology and they were going to do it at 10 PM yesterday Dr. Isak Jonas the surgeon was going to see him at 6 PM yesterday however the patient and his wife left before then. He does have an appointment at 1:30 PM today in [facility] with his surgeon Dr. Francy Norton that did a robotic assisted hernia repair with mesh prior to all this starting. Patient is not having any new fevers, nausea, vomiting, chest pain, shortness of breath, dizziness, weakness or other new symptoms. He is not having any problems with a central line, he just wants \"it out because it is annoying. \"    The history is provided by the patient. Past Medical History:   Diagnosis Date    Arthritis     Chronic pain     arthritic    CKD (chronic kidney disease) 2018    BASELINE CREATININE IN LOW 2'S    Colon polyps 2018    Hernia of abdominal cavity 31/10/3411    POST OP UMBILICAL HERNIA 0/81 WITH RESULTANT VENTRAL HERNIA, NON INCARCERATED.  Hypertension     ON MEDS    Morbid obesity ([address]     BMI 47    Non-insulin dependent type 2 diabetes mellitus ([facility] [address]     WAS TOLD HE DID NOT HAVE DM ANY MORE IN [place] OF 2019.   UNKNOWN A1 C    Unintentional weight loss 8/24/2019    50# POST OP July-AUGUST 2019    Unspecified sleep apnea     does not require a c-pap    Ventral hernia JULY 2019 POST OP       Past Surgical History:   Procedure Laterality Date    HX COLONOSCOPY      HX HERNIA REPAIR  07/2019    IR INSERT TUNL CVC W/O PORT OVER 5 YR  8/27/2019         Family History:   Problem Relation Age of Onset    Diabetes Mother     Cancer Sister     Stroke Sister     Heart Disease Sister        Social History     Socioeconomic History    Marital status:      Spouse name: Not on file    Number of children: Not on file    Years of education: Not on file    Highest education level: Not on file   Occupational History    Not on file   Social Needs    Financial resource strain: Not on file    Food insecurity:     Worry: Not on file     Inability: Not on file    Transportation needs:     Medical: Not on file     Non-medical: Not on file   Tobacco Use    Smoking status: Never Smoker    Smokeless tobacco: Never Used   Substance and Sexual Activity    Alcohol use: No    Drug use: No    Sexual activity: Not on file   Lifestyle    Physical activity:     Days per week: Not on file     Minutes per session: Not on file    Stress: Not on file   Relationships    Social connections:     Talks on phone: Not on file     Gets together: Not on file     Attends Sabianism service: Not on file     Active member of club or organization: Not on file     Attends meetings of clubs or organizations: Not on file     Relationship status: Not on file    Intimate partner violence:     Fear of current or ex partner: Not on file     Emotionally abused: Not on file     Physically abused: Not on file     Forced sexual activity: Not on file   Other Topics Concern    Not on file   Social History Narrative    8/24:  [address] [facility], [address] [place] [address] ([phone]). HE DOES NOT HAVE ANY CHILDREN. ALLERGIES: Pcn [penicillins]    Review of Systems   Constitutional: Negative. HENT: Negative. Eyes: Negative. Respiratory: Negative. Cardiovascular: Negative. Gastrointestinal: Negative. Genitourinary: Negative. Musculoskeletal: Negative. Skin: Positive for wound. Neurological: Negative. Psychiatric/Behavioral: Negative. All other systems reviewed and are negative.       Vitals:    08/29/19 0949   BP: 108/69   Pulse: (!) 109   Resp: 20   Temp: 98.1 °F (36.7 °C)   SpO2: 99%   Weight: 97.5 kg (215 lb)   Height: 5' 6\" (1.676 m)            Physical Exam   Constitutional: He is oriented to person, place, and time. He appears well-developed and well-nourished. HENT:   Head: Normocephalic and atraumatic. Right Ear: External ear normal.   Left Ear: External ear normal.   Nose: Nose normal.   Mouth/Throat: Oropharynx is clear and moist.   Eyes: Pupils are equal, round, and reactive to light. Conjunctivae and EOM are normal.   Neck: Normal range of motion. Neck supple. Cardiovascular: Normal rate, regular rhythm, normal heart sounds and intact distal pulses. Pulmonary/Chest: Effort normal and breath sounds normal.   Abdominal: Soft. Bowel sounds are normal.   Musculoskeletal: Normal range of motion. Neurological: He is alert and oriented to person, place, and time. He has normal reflexes. Skin: Skin is warm and dry. No rash noted. No erythema. No pallor. There is a central line in place to the right chest.  No erythema, swelling or pain. No fluctuance. Psychiatric: He has a normal mood and affect. His behavior is normal. Judgment and thought content normal.   Nursing note and vitals reviewed. MDM  Number of Diagnoses or Management Options  Visit for wound check:      Amount and/or Complexity of Data Reviewed  Review and summarize past medical records: yes  Discuss the patient with other providers: yes (Luan Weeks NP)    Risk of Complications, Morbidity, and/or Mortality  Presenting problems: moderate  Diagnostic procedures: moderate  Management options: moderate    Patient Progress  Patient progress: stable         Procedures    The patient was observed in the ED. Results Reviewed:      Recent Results (from the past 24 hour(s))   GLUCOSE, POC    Collection Time: 08/28/19 11:43 AM   Result Value Ref Range    Glucose (POC) 207 (H) 65 - 100 mg/dL     I have explained to the patient that the hospitalist wanted IR to remove the central line and that would need to be done in the IR department. Patient has an appointment at [facility] in [facility] and will need for then.   He then asked me if we could flush the central line which the nurse did. Patient should go now to his appointment in [facility] as scheduled with his surgeon. All of patient's questions were answered and he is stable for discharge at this time. I discussed the results of all labs, procedures, radiographs, and treatments with the patient and available family. Treatment plan is agreed upon and the patient is ready for discharge. All voiced understanding of the discharge plan and medication instructions or changes as appropriate. Questions about treatment in the ED were answered. All were encouraged to return should symptoms worsen or new problems develop.

## 2022-03-19 PROBLEM — N18.4 ACUTE RENAL FAILURE SUPERIMPOSED ON STAGE 4 CHRONIC KIDNEY DISEASE (HCC): Status: ACTIVE | Noted: 2019-08-24

## 2022-03-19 PROBLEM — R74.8 ELEVATED LIPASE: Status: ACTIVE | Noted: 2019-08-24

## 2022-03-19 PROBLEM — R10.9 ABDOMINAL PAIN: Status: ACTIVE | Noted: 2019-08-24

## 2022-03-19 PROBLEM — K43.2 RECURRENT VENTRAL HERNIA: Status: ACTIVE | Noted: 2019-08-24

## 2022-03-19 PROBLEM — R63.4 UNINTENTIONAL WEIGHT LOSS: Status: ACTIVE | Noted: 2019-08-24

## 2022-03-19 PROBLEM — R11.2 INTRACTABLE NAUSEA AND VOMITING: Status: ACTIVE | Noted: 2019-08-24

## 2022-03-19 PROBLEM — N17.9 ACUTE RENAL FAILURE SUPERIMPOSED ON STAGE 4 CHRONIC KIDNEY DISEASE (HCC): Status: ACTIVE | Noted: 2019-08-24

## 2022-03-19 PROBLEM — R19.7 DIARRHEA: Status: ACTIVE | Noted: 2019-08-24

## 2022-03-19 PROBLEM — R53.1 WEAKNESS: Status: ACTIVE | Noted: 2019-08-24

## 2022-03-19 PROBLEM — D64.9 ANEMIA: Status: ACTIVE | Noted: 2019-08-24

## 2022-03-19 PROBLEM — E66.01 MORBID OBESITY (HCC): Status: ACTIVE | Noted: 2019-08-24

## 2022-03-19 PROBLEM — K59.00 CONSTIPATION: Status: ACTIVE | Noted: 2019-08-24

## 2022-03-19 PROBLEM — E11.9 DIABETES (HCC): Status: ACTIVE | Noted: 2019-08-24

## 2022-03-19 PROBLEM — I10 HYPERTENSION: Status: ACTIVE | Noted: 2019-08-24

## 2022-03-20 PROBLEM — K56.609 SMALL BOWEL OBSTRUCTION (HCC): Status: ACTIVE | Noted: 2019-08-24

## 2022-03-20 PROBLEM — K46.9 HERNIA OF ABDOMINAL CAVITY: Status: ACTIVE | Noted: 2019-08-24

## 2023-09-21 NOTE — PROGRESS NOTES
Hospitalist Progress Note    Subjective:   Daily Progress Note: 8/27/2019 1805    Patient presented to ER 8/24 afternoon with an approximate 6 day history of waxing and waning diffuse, crampy abdominal pain, \"all over,\" along with intractable nausea and vomiting.  Reports the worst of the pain as just under diaphragm.  He has not retained solids or liquids in that time frame.  He reports a very small amount semi-formed stool expelled about 3 days ago, prior to that he has had brown and yellow liquid stools in very small amounts.  Rarely passing gas. He is extremely obese. Had an 8 cm lap, robotic  ventral hernia repair with mesh placement and a benign lipoma removed from his back July 12 at [facility] that seems to have been incarcerated, then patient describes some type of right lower quad abscess/seroma (?) aspiration 2 days post op. [facility] reports he never had a normal BM post op and was readmitted for ten days and two days respectively for persistent SBO with questionable post op ventral hernia. Last admission 8/15-18 at [facility] with CT report as noted below. [facility] states he felt marginally better on discharge, but when he attempted to eat again, began to vomit. [facility] signed out AMA from both admissions. During both admissions, he was treated conservatively with bowel rest, N/G, antiemetics and IVF.  Has not had any additional surgery. In ER, he is found with persistent SBO, N/G placed with gastric fluid return, and administered antiemetics.  8/26:  Passing some gas. Small BM. Abdomen more soft.        8/27:  Tachy to 115 with history of same. Home coreg dosing and nifedipine begun. TPN begun after central line placed. Patient and wife a little less agitated and hostile today. Patient convinced he is leaving tomorrow to go have surgery with primary surgeon at [facility]. Refuses to believe otherwise. Explained repeatedly there is not a quick fix. Surgery with plans for CT tomorrow.  Refusing to participate in PT. Current Facility-Administered Medications   Medication Dose Route Frequency    lidocaine 4 % patch 1 Patch  1 Patch TransDERmal Q24H    TPN ADULT - dextrose 5% amino acid 4.25%   IntraVENous QPM    HYDROcodone-acetaminophen (NORCO) 5-325 mg per tablet 1 Tab  1 Tab Oral Q4H PRN    morphine injection 2 mg  2 mg IntraVENous Q4H PRN    fat emulsion 20% (LIPOSYN, INTRAlipid) infusion 250 mL  250 mL IntraVENous QPM    naloxone (NARCAN) injection 0.4 mg  0.4 mg IntraVENous PRN    ondansetron (ZOFRAN) injection 4 mg  4 mg IntraVENous Q4H PRN    promethazine (PHENERGAN) with saline injection 12.5 mg  12.5 mg IntraVENous Q4H PRN    diphenhydrAMINE (BENADRYL) injection 12.5 mg  12.5 mg IntraVENous Q4H PRN    LORazepam (ATIVAN) injection 1 mg  1 mg IntraVENous Q6H PRN    heparin (porcine) injection 5,000 Units  5,000 Units SubCUTAneous Q8H    insulin lispro (HUMALOG) injection   SubCUTAneous AC&HS    pantoprazole (PROTONIX) 40 mg in sodium chloride 0.9% 10 mL injection  40 mg IntraVENous DAILY    hydrALAZINE (APRESOLINE) 20 mg/mL injection 10 mg  10 mg IntraVENous Q6H PRN        Review of Systems  A comprehensive review of systems was negative except for that written in the HPI. Objective:     Visit Vitals  /88   Pulse 98   Temp 98.6 °F (37 °C)   Resp 18   Ht 5' 6\" (1.676 m)   Wt 97 kg (213 lb 13.5 oz)   SpO2 94%   BMI 34.52 kg/m²      O2 Device: Room air    Temp (24hrs), Av.4 °F (36.9 °C), Min:98.1 °F (36.7 °C), Max:98.8 °F (37.1 °C)    1901 -  0700  In: 8437 [I.V.:1699]  Out: 7546 [Urine:2975]    General appearance: Oriented and alert. Patient and wife both suspicious. Despite multiple explanations, both continue to ask me when surgery will be done, want \"right now. \"  Unkempt.  Morbidly obese despite 50# weight loss reported in the past month.  Wife at bedside.    Head: Normocephalic, without obvious abnormality, atraumatic  Eyes: conjunctivae/corneas clear.  PERRL  Throat: Lips, mucosa, and tongue normal. Teeth and gums dry.    Neck: supple, symmetrical, trachea midline, no JVD  Lungs: clear to auscultation bilaterally, slightly diminished in bases.   Heart: regular rate and rhythm, S1, S2 normal, no murmur, click, rub or gallop  Abdomen: Pendulous, slightly tense.  No rebound or guarding, minimal increased pain with palpation.  Rare, faint, tinkling bowel sounds noted.  Worst pain in bilateral upper quads.  Passing gas intermittently. Ventral hernia palpable, does not seem to be incarcerated.   Extremities: extremities normal, atraumatic, no cyanosis or edema  Skin: Skin color, texture, turgor normal. No rashes or lesions  Neurologic: Grossly normal     Data Review  Recent Results (from the past 24 hour(s))   GLUCOSE, POC    Collection Time: 08/26/19  7:29 AM   Result Value Ref Range    Glucose (POC) 135 (H) 65 - 100 mg/dL   GLUCOSE, POC    Collection Time: 08/26/19 11:22 AM   Result Value Ref Range    Glucose (POC) 127 (H) 65 - 100 mg/dL   GLUCOSE, POC    Collection Time: 08/26/19  3:42 PM   Result Value Ref Range    Glucose (POC) 162 (H) 65 - 100 mg/dL   PLEASE READ & DOCUMENT PPD TEST IN 48 HRS    Collection Time: 08/26/19  4:28 PM   Result Value Ref Range    PPD Negative Negative    mm Induration 0 0 - 5 mm   GLUCOSE, POC    Collection Time: 08/26/19  9:37 PM   Result Value Ref Range    Glucose (POC) 113 (H) 65 - 688 mg/dL   METABOLIC PANEL, COMPREHENSIVE    Collection Time: 08/27/19  4:19 AM   Result Value Ref Range    Sodium 147 (H) 136 - 145 mmol/L    Potassium 4.2 3.5 - 5.1 mmol/L    Chloride 114 (H) 98 - 107 mmol/L    CO2 24 21 - 32 mmol/L    Anion gap 9 7 - 16 mmol/L    Glucose 136 (H) 65 - 100 mg/dL    BUN 58 (H) 8 - 23 MG/DL    Creatinine 1.99 (H) 0.8 - 1.5 MG/DL    GFR est AA 44 (L) >60 ml/min/1.73m2    GFR est non-AA 36 (L) >60 ml/min/1.73m2    Calcium 9.0 8.3 - 10.4 MG/DL    Bilirubin, total 0.5 0.2 - 1.1 MG/DL    ALT (SGPT) 12 12 - 65 U/L    AST (SGOT) 13 (L) 15 - 37 U/L Alk. phosphatase 96 50 - 136 U/L    Protein, total 6.5 6.3 - 8.2 g/dL    Albumin 2.4 (L) 3.2 - 4.6 g/dL    Globulin 4.1 (H) 2.3 - 3.5 g/dL    A-G Ratio 0.6 (L) 1.2 - 3.5     CBC WITH AUTOMATED DIFF    Collection Time: 08/27/19  4:19 AM   Result Value Ref Range    WBC 8.2 4.3 - 11.1 K/uL    RBC 3.12 (L) 4.23 - 5.6 M/uL    HGB 9.3 (L) 13.6 - 17.2 g/dL    HCT 30.4 (L) 41.1 - 50.3 %    MCV 97.4 79.6 - 97.8 FL    MCH 29.8 26.1 - 32.9 PG    MCHC 30.6 (L) 31.4 - 35.0 g/dL    RDW 15.7 (H) 11.9 - 14.6 %    PLATELET 677 632 - 524 K/uL    MPV 11.6 9.4 - 12.3 FL    ABSOLUTE NRBC 0.00 0.0 - 0.2 K/uL    DF AUTOMATED      NEUTROPHILS 66 43 - 78 %    LYMPHOCYTES 19 13 - 44 %    MONOCYTES 12 4.0 - 12.0 %    EOSINOPHILS 3 0.5 - 7.8 %    BASOPHILS 0 0.0 - 2.0 %    IMMATURE GRANULOCYTES 1 0.0 - 5.0 %    ABS. NEUTROPHILS 5.4 1.7 - 8.2 K/UL    ABS. LYMPHOCYTES 1.5 0.5 - 4.6 K/UL    ABS. MONOCYTES 1.0 0.1 - 1.3 K/UL    ABS. EOSINOPHILS 0.3 0.0 - 0.8 K/UL    ABS. BASOPHILS 0.0 0.0 - 0.2 K/UL    ABS. IMM. GRANS. 0.1 0.0 - 0.5 K/UL          8/24:  CXR: No acute findings     8/24:  CT ABDOMEN AND PELVIS: Findings compatible with small bowel obstruction with multiple dilated loops of small bowel seen within the upper abdomen. It is difficult to identify a zone of transition, but multiple decompressed loops of small bowel are present in the  right lower quadrant. Fluid-containing supraumbilical ventral abdominal wall hernia. Cholelithiasis. Multiple low-density lesions within the spleen which cannot be further  characterized on this exam. Correlation with targeted sonography, as an  outpatient on a nonemergent basis, recommended for further evaluation.     8/24:  KUB:  There is an NG tube present to the right of midline, likely in the distal stomach. There is marked dilation of loops of small bowel. Findings suspicious  for obstruction.     8/24:  KUB:  There is an NG tube which terminates in the stomach.  Dilated loops of  small bowel present.       OLD RECORDS:   ANMED:  CT ABDOMEN AND PELVIS: 8/15/19:  ACUTE DISTAL SMALL BOWEL OBSTRUCTION LARGE PERIUMBILICAL HERNIA SAC, SEEN ON THE PREVIOUS STUDY OF 7/26/2019, WITHOUT BOWEL CONTENT BUT WITH SIGNIFICANT PERITONEAL FAT CONTENT, POSSIBLY RELATED TO THE SMALL BOWEL OBSTRUCTION. INCIDENTALLY OBSERVED IS UNCOMPLICATED CHOLELITHIASIS  MODERATE BILATERAL RENAL ATROPHY     Assessment/Plan:   Intractable nausea and vomiting, intermittent diarrhea post op:  Improved              Dietician consult for TPN: central line placed              Antiemetics and analgesics              PPI IV daily              N/G to LIS              Daily weight              LR at 125 ml/hr              NPO              I+O  Abdominal pain due to SBO:  Improved hospital day 1              As above  Persistent, recurrent post op Small bowel obstruction              As above              Surgical consult complete 8/26  S/P robotic assisted ventral hernia repair with mesh placement and benign lipoma removal from back July 12, 2019 at [facility] :  Concern for mesh complication with bowel involvement.    Possible hernia recurrence vs post op seroma on CT, suspect seroma              Surgery following   Unspecified sleep apnea:  does not require a c-pap  Elevated lipase              Monitor   Morbid obesity with BMI 54              Reports unintentional 50# weight loss post op  Hypertension: on meds at home              Use IV hydralazine prn   NIDDM:  7/2019:  PATIENT INFORMED HE DOES NOT HAVE DIABETES ANY MORE.  GLUCOTROL AND METFORMIN DISCONTINUED at that time              A1C: 5.4, discontinue SSI    Generalized weakness              PT/OT consults              BW for discharge planning              Fall precautions  Acute on chronic renal failure, stage 3-4:  Baseline creatinine in low 2's              Daily BMP              Continue IVF              I+O, daily weight   Anemia:  Suspect multifactorial              Monitor daily  Extreme medical noncompliance with hostility:  Left last 2 hospitalizations AMA. Attempting to direct course of care. Demanding surgery and quick fix despite teaching to the contrary.     Encourage compliance consistently   Wife is a major contributing negative factor   Ultimately will need follow up with primary [place] discussed with: Patient, wife, RN    Signed By: Margarita Schuster NP     August 27, 2019 No. RORO screening performed.  STOP BANG Legend: 0-2 = LOW Risk; 3-4 = INTERMEDIATE Risk; 5-8 = HIGH Risk

## 2023-10-02 ENCOUNTER — APPOINTMENT (OUTPATIENT)
Dept: CT IMAGING | Age: 68
DRG: 640 | End: 2023-10-02
Payer: MEDICARE

## 2023-10-02 ENCOUNTER — APPOINTMENT (OUTPATIENT)
Dept: GENERAL RADIOLOGY | Age: 68
DRG: 640 | End: 2023-10-02
Payer: MEDICARE

## 2023-10-02 ENCOUNTER — HOSPITAL ENCOUNTER (INPATIENT)
Age: 68
LOS: 2 days | Discharge: LEFT BEFORE DISCHARGE | DRG: 640 | End: 2023-10-04
Attending: EMERGENCY MEDICINE | Admitting: FAMILY MEDICINE
Payer: MEDICARE

## 2023-10-02 DIAGNOSIS — E87.5 HYPERKALEMIA: Primary | ICD-10-CM

## 2023-10-02 DIAGNOSIS — D73.89 SPLENIC LESION: ICD-10-CM

## 2023-10-02 DIAGNOSIS — R06.00 DYSPNEA, UNSPECIFIED TYPE: ICD-10-CM

## 2023-10-02 PROBLEM — R10.9 ABDOMINAL PAIN: Status: RESOLVED | Noted: 2019-08-24 | Resolved: 2023-10-02

## 2023-10-02 PROBLEM — N17.9 ACUTE RENAL FAILURE SUPERIMPOSED ON STAGE 4 CHRONIC KIDNEY DISEASE (HCC): Status: RESOLVED | Noted: 2019-08-24 | Resolved: 2023-10-02

## 2023-10-02 PROBLEM — N18.4 ACUTE RENAL FAILURE SUPERIMPOSED ON STAGE 4 CHRONIC KIDNEY DISEASE (HCC): Status: RESOLVED | Noted: 2019-08-24 | Resolved: 2023-10-02

## 2023-10-02 PROBLEM — N25.81 HYPERPARATHYROIDISM, SECONDARY RENAL (HCC): Status: ACTIVE | Noted: 2018-12-18

## 2023-10-02 PROBLEM — K59.00 CONSTIPATION: Status: ACTIVE | Noted: 2019-08-24

## 2023-10-02 PROBLEM — R19.7 DIARRHEA: Status: RESOLVED | Noted: 2019-08-24 | Resolved: 2023-10-02

## 2023-10-02 PROBLEM — Z99.2 ESRD ON DIALYSIS (HCC): Chronic | Status: ACTIVE | Noted: 2023-10-02

## 2023-10-02 PROBLEM — I50.32 DIASTOLIC CHF, CHRONIC (HCC): Status: ACTIVE | Noted: 2017-12-07

## 2023-10-02 PROBLEM — R53.1 WEAKNESS: Status: RESOLVED | Noted: 2019-08-24 | Resolved: 2023-10-02

## 2023-10-02 PROBLEM — N04.9 NEPHROTIC SYNDROME: Status: ACTIVE | Noted: 2020-03-09

## 2023-10-02 PROBLEM — K56.609 SMALL BOWEL OBSTRUCTION (HCC): Status: RESOLVED | Noted: 2019-08-24 | Resolved: 2023-10-02

## 2023-10-02 PROBLEM — E66.01 MORBID OBESITY (HCC): Status: ACTIVE | Noted: 2019-08-24

## 2023-10-02 PROBLEM — J30.9 ALLERGIC RHINITIS: Status: ACTIVE | Noted: 2017-08-08

## 2023-10-02 PROBLEM — D47.2 MONOCLONAL GAMMOPATHY OF UNKNOWN SIGNIFICANCE (MGUS): Status: ACTIVE | Noted: 2017-11-07

## 2023-10-02 PROBLEM — R11.2 INTRACTABLE NAUSEA AND VOMITING: Status: RESOLVED | Noted: 2019-08-24 | Resolved: 2023-10-02

## 2023-10-02 PROBLEM — R63.4 UNINTENTIONAL WEIGHT LOSS: Status: RESOLVED | Noted: 2019-08-24 | Resolved: 2023-10-02

## 2023-10-02 PROBLEM — M10.30 GOUT DUE TO RENAL IMPAIRMENT, UNSPECIFIED SITE: Status: ACTIVE | Noted: 2020-05-01

## 2023-10-02 PROBLEM — N18.6 ESRD ON DIALYSIS (HCC): Chronic | Status: ACTIVE | Noted: 2023-10-02

## 2023-10-02 PROBLEM — C90.00 MULTIPLE MYELOMA (HCC): Status: ACTIVE | Noted: 2023-10-02

## 2023-10-02 PROBLEM — I13.2 HYPERTENSIVE HEART AND CHRONIC KIDNEY DISEASE WITH HEART FAILURE AND WITH STAGE 5 CHRONIC KIDNEY DISEASE, OR END STAGE RENAL DISEASE (HCC): Status: ACTIVE | Noted: 2020-05-01

## 2023-10-02 PROBLEM — K21.9 GASTROESOPHAGEAL REFLUX DISEASE: Status: ACTIVE | Noted: 2017-08-08

## 2023-10-02 LAB
ALBUMIN SERPL-MCNC: 3.1 G/DL (ref 3.2–4.6)
ALBUMIN/GLOB SERPL: 0.7 (ref 0.4–1.6)
ALP SERPL-CCNC: 61 U/L (ref 50–136)
ALT SERPL-CCNC: 19 U/L (ref 12–65)
ANION GAP SERPL CALC-SCNC: 3 MMOL/L (ref 2–11)
AST SERPL-CCNC: 45 U/L (ref 15–37)
BASOPHILS # BLD: 0.1 K/UL (ref 0–0.2)
BASOPHILS NFR BLD: 0 % (ref 0–2)
BILIRUB SERPL-MCNC: 0.4 MG/DL (ref 0.2–1.1)
BUN BLD-MCNC: 71 MG/DL (ref 8–26)
BUN SERPL-MCNC: 57 MG/DL (ref 8–23)
CALCIUM SERPL-MCNC: 10 MG/DL (ref 8.3–10.4)
CHLORIDE BLD-SCNC: 100 MMOL/L (ref 98–107)
CHLORIDE SERPL-SCNC: 103 MMOL/L (ref 101–110)
CK SERPL-CCNC: 55 U/L (ref 21–215)
CO2 BLD-SCNC: 29.5 MMOL/L (ref 21–32)
CO2 SERPL-SCNC: 32 MMOL/L (ref 21–32)
CREAT BLD-MCNC: 13.69 MG/DL (ref 0.8–1.5)
CREAT SERPL-MCNC: 11.3 MG/DL (ref 0.8–1.5)
D DIMER PPP FEU-MCNC: 1.22 UG/ML(FEU)
DIFFERENTIAL METHOD BLD: ABNORMAL
EKG DIAGNOSIS: NORMAL
EKG Q-T INTERVAL: 344 MS
EKG QRS DURATION: 82 MS
EKG QTC CALCULATION (BAZETT): 434 MS
EKG R AXIS: -2 DEGREES
EKG T AXIS: 9 DEGREES
EKG VENTRICULAR RATE: 96 BPM
EOSINOPHIL # BLD: 0.3 K/UL (ref 0–0.8)
EOSINOPHIL NFR BLD: 2 % (ref 0.5–7.8)
ERYTHROCYTE [DISTWIDTH] IN BLOOD BY AUTOMATED COUNT: 13.5 % (ref 11.9–14.6)
GLOBULIN SER CALC-MCNC: 4.5 G/DL (ref 2.8–4.5)
GLUCOSE BLD STRIP.AUTO-MCNC: 136 MG/DL (ref 65–100)
GLUCOSE BLD STRIP.AUTO-MCNC: 85 MG/DL (ref 65–100)
GLUCOSE BLD STRIP.AUTO-MCNC: 92 MG/DL (ref 65–100)
GLUCOSE BLD-MCNC: 80 MG/DL (ref 65–100)
GLUCOSE SERPL-MCNC: 98 MG/DL (ref 65–100)
HBV SURFACE AG SER QL: NONREACTIVE
HCT VFR BLD AUTO: 39 % (ref 41.1–50.3)
HGB BLD-MCNC: 12.1 G/DL (ref 13.6–17.2)
IMM GRANULOCYTES # BLD AUTO: 0.1 K/UL (ref 0–0.5)
IMM GRANULOCYTES NFR BLD AUTO: 0 % (ref 0–5)
LDH SERPL L TO P-CCNC: 174 U/L (ref 110–210)
LYMPHOCYTES # BLD: 2.2 K/UL (ref 0.5–4.6)
LYMPHOCYTES NFR BLD: 19 % (ref 13–44)
MAGNESIUM SERPL-MCNC: 2.7 MG/DL (ref 1.8–2.4)
MCH RBC QN AUTO: 31.8 PG (ref 26.1–32.9)
MCHC RBC AUTO-ENTMCNC: 31 G/DL (ref 31.4–35)
MCV RBC AUTO: 102.6 FL (ref 82–102)
MONOCYTES # BLD: 0.9 K/UL (ref 0.1–1.3)
MONOCYTES NFR BLD: 8 % (ref 4–12)
NEUTS SEG # BLD: 7.8 K/UL (ref 1.7–8.2)
NEUTS SEG NFR BLD: 71 % (ref 43–78)
NRBC # BLD: 0 K/UL (ref 0–0.2)
PHOSPHATE SERPL-MCNC: 5.3 MG/DL (ref 2.3–3.7)
PLATELET # BLD AUTO: 231 K/UL (ref 150–450)
PMV BLD AUTO: 10.4 FL (ref 9.4–12.3)
POTASSIUM BLD-SCNC: 7.6 MMOL/L (ref 3.5–5.1)
POTASSIUM SERPL-SCNC: 5 MMOL/L (ref 3.5–5.1)
POTASSIUM SERPL-SCNC: ABNORMAL MMOL/L (ref 3.5–5.1)
PROT SERPL-MCNC: 7.6 G/DL (ref 6.3–8.2)
RBC # BLD AUTO: 3.8 M/UL (ref 4.23–5.6)
SARS-COV-2 RDRP RESP QL NAA+PROBE: NOT DETECTED
SERVICE CMNT-IMP: ABNORMAL
SERVICE CMNT-IMP: NORMAL
SERVICE CMNT-IMP: NORMAL
SODIUM BLD-SCNC: 132 MMOL/L (ref 136–145)
SODIUM SERPL-SCNC: 138 MMOL/L (ref 133–143)
SOURCE: NORMAL
TROPONIN I SERPL HS-MCNC: 23.7 PG/ML (ref 0–14)
TROPONIN I SERPL HS-MCNC: 26.7 PG/ML (ref 0–14)
URATE SERPL-MCNC: 6.1 MG/DL (ref 2.6–6)
WBC # BLD AUTO: 11.3 K/UL (ref 4.3–11.1)

## 2023-10-02 PROCEDURE — 80047 BASIC METABLC PNL IONIZED CA: CPT

## 2023-10-02 PROCEDURE — 6360000004 HC RX CONTRAST MEDICATION: Performed by: PHYSICIAN ASSISTANT

## 2023-10-02 PROCEDURE — 87635 SARS-COV-2 COVID-19 AMP PRB: CPT

## 2023-10-02 PROCEDURE — 6360000002 HC RX W HCPCS: Performed by: PHYSICIAN ASSISTANT

## 2023-10-02 PROCEDURE — 71260 CT THORAX DX C+: CPT

## 2023-10-02 PROCEDURE — 85025 COMPLETE CBC W/AUTO DIFF WBC: CPT

## 2023-10-02 PROCEDURE — 83735 ASSAY OF MAGNESIUM: CPT

## 2023-10-02 PROCEDURE — 70450 CT HEAD/BRAIN W/O DYE: CPT

## 2023-10-02 PROCEDURE — 96365 THER/PROPH/DIAG IV INF INIT: CPT

## 2023-10-02 PROCEDURE — 84132 ASSAY OF SERUM POTASSIUM: CPT

## 2023-10-02 PROCEDURE — 99285 EMERGENCY DEPT VISIT HI MDM: CPT

## 2023-10-02 PROCEDURE — 71046 X-RAY EXAM CHEST 2 VIEWS: CPT

## 2023-10-02 PROCEDURE — 85379 FIBRIN DEGRADATION QUANT: CPT

## 2023-10-02 PROCEDURE — 6370000000 HC RX 637 (ALT 250 FOR IP): Performed by: FAMILY MEDICINE

## 2023-10-02 PROCEDURE — 84550 ASSAY OF BLOOD/URIC ACID: CPT

## 2023-10-02 PROCEDURE — 84100 ASSAY OF PHOSPHORUS: CPT

## 2023-10-02 PROCEDURE — 6370000000 HC RX 637 (ALT 250 FOR IP): Performed by: PHYSICIAN ASSISTANT

## 2023-10-02 PROCEDURE — 1100000000 HC RM PRIVATE

## 2023-10-02 PROCEDURE — 82962 GLUCOSE BLOOD TEST: CPT

## 2023-10-02 PROCEDURE — 2500000003 HC RX 250 WO HCPCS: Performed by: PHYSICIAN ASSISTANT

## 2023-10-02 PROCEDURE — 96375 TX/PRO/DX INJ NEW DRUG ADDON: CPT

## 2023-10-02 PROCEDURE — 84484 ASSAY OF TROPONIN QUANT: CPT

## 2023-10-02 PROCEDURE — 73562 X-RAY EXAM OF KNEE 3: CPT

## 2023-10-02 PROCEDURE — 83615 LACTATE (LD) (LDH) ENZYME: CPT

## 2023-10-02 PROCEDURE — 83521 IG LIGHT CHAINS FREE EACH: CPT

## 2023-10-02 PROCEDURE — 96361 HYDRATE IV INFUSION ADD-ON: CPT

## 2023-10-02 PROCEDURE — 87340 HEPATITIS B SURFACE AG IA: CPT

## 2023-10-02 PROCEDURE — 80053 COMPREHEN METABOLIC PANEL: CPT

## 2023-10-02 PROCEDURE — 94644 CONT INHLJ TX 1ST HOUR: CPT

## 2023-10-02 PROCEDURE — 84165 PROTEIN E-PHORESIS SERUM: CPT

## 2023-10-02 PROCEDURE — 86803 HEPATITIS C AB TEST: CPT

## 2023-10-02 PROCEDURE — 82784 ASSAY IGA/IGD/IGG/IGM EACH: CPT

## 2023-10-02 PROCEDURE — 86334 IMMUNOFIX E-PHORESIS SERUM: CPT

## 2023-10-02 PROCEDURE — 2580000003 HC RX 258: Performed by: FAMILY MEDICINE

## 2023-10-02 PROCEDURE — 6360000002 HC RX W HCPCS: Performed by: FAMILY MEDICINE

## 2023-10-02 PROCEDURE — 82550 ASSAY OF CK (CPK): CPT

## 2023-10-02 PROCEDURE — 2580000003 HC RX 258: Performed by: PHYSICIAN ASSISTANT

## 2023-10-02 RX ORDER — CALCIUM GLUCONATE 94 MG/ML
1000 INJECTION, SOLUTION INTRAVENOUS ONCE
Status: DISCONTINUED | OUTPATIENT
Start: 2023-10-02 | End: 2023-10-02 | Stop reason: SDUPTHER

## 2023-10-02 RX ORDER — SODIUM CHLORIDE 0.9 % (FLUSH) 0.9 %
5-40 SYRINGE (ML) INJECTION PRN
Status: DISCONTINUED | OUTPATIENT
Start: 2023-10-02 | End: 2023-10-04 | Stop reason: HOSPADM

## 2023-10-02 RX ORDER — IBUPROFEN 600 MG/1
1 TABLET ORAL PRN
Status: DISCONTINUED | OUTPATIENT
Start: 2023-10-02 | End: 2023-10-04 | Stop reason: HOSPADM

## 2023-10-02 RX ORDER — SODIUM CHLORIDE 9 MG/ML
INJECTION, SOLUTION INTRAVENOUS PRN
Status: DISCONTINUED | OUTPATIENT
Start: 2023-10-02 | End: 2023-10-04 | Stop reason: HOSPADM

## 2023-10-02 RX ORDER — ONDANSETRON 4 MG/1
4 TABLET, ORALLY DISINTEGRATING ORAL EVERY 8 HOURS PRN
Status: DISCONTINUED | OUTPATIENT
Start: 2023-10-02 | End: 2023-10-04 | Stop reason: HOSPADM

## 2023-10-02 RX ORDER — HEPARIN SODIUM 5000 [USP'U]/ML
5000 INJECTION, SOLUTION INTRAVENOUS; SUBCUTANEOUS EVERY 8 HOURS SCHEDULED
Status: DISCONTINUED | OUTPATIENT
Start: 2023-10-02 | End: 2023-10-04 | Stop reason: HOSPADM

## 2023-10-02 RX ORDER — ASPIRIN 81 MG/1
81 TABLET ORAL DAILY
Status: DISCONTINUED | OUTPATIENT
Start: 2023-10-03 | End: 2023-10-04 | Stop reason: HOSPADM

## 2023-10-02 RX ORDER — ACETAMINOPHEN 325 MG/1
650 TABLET ORAL EVERY 6 HOURS PRN
Status: DISCONTINUED | OUTPATIENT
Start: 2023-10-02 | End: 2023-10-04 | Stop reason: HOSPADM

## 2023-10-02 RX ORDER — DEXTROSE MONOHYDRATE 100 MG/ML
INJECTION, SOLUTION INTRAVENOUS CONTINUOUS PRN
Status: DISCONTINUED | OUTPATIENT
Start: 2023-10-02 | End: 2023-10-04 | Stop reason: HOSPADM

## 2023-10-02 RX ORDER — ONDANSETRON 2 MG/ML
4 INJECTION INTRAMUSCULAR; INTRAVENOUS EVERY 6 HOURS PRN
Status: DISCONTINUED | OUTPATIENT
Start: 2023-10-02 | End: 2023-10-04 | Stop reason: HOSPADM

## 2023-10-02 RX ORDER — SODIUM CHLORIDE 0.9 % (FLUSH) 0.9 %
5-40 SYRINGE (ML) INJECTION EVERY 12 HOURS SCHEDULED
Status: DISCONTINUED | OUTPATIENT
Start: 2023-10-02 | End: 2023-10-04 | Stop reason: HOSPADM

## 2023-10-02 RX ORDER — CALCIUM GLUCONATE 20 MG/ML
1000 INJECTION, SOLUTION INTRAVENOUS ONCE
Status: COMPLETED | OUTPATIENT
Start: 2023-10-02 | End: 2023-10-02

## 2023-10-02 RX ORDER — FUROSEMIDE 10 MG/ML
40 INJECTION INTRAMUSCULAR; INTRAVENOUS ONCE
Status: COMPLETED | OUTPATIENT
Start: 2023-10-02 | End: 2023-10-02

## 2023-10-02 RX ORDER — CALCITRIOL 0.25 UG/1
0.25 CAPSULE, LIQUID FILLED ORAL DAILY
Status: DISCONTINUED | OUTPATIENT
Start: 2023-10-03 | End: 2023-10-04 | Stop reason: HOSPADM

## 2023-10-02 RX ORDER — SODIUM BICARBONATE 650 MG/1
650 TABLET ORAL 4 TIMES DAILY
Status: DISCONTINUED | OUTPATIENT
Start: 2023-10-02 | End: 2023-10-04 | Stop reason: HOSPADM

## 2023-10-02 RX ADMIN — HEPARIN SODIUM 5000 UNITS: 5000 INJECTION INTRAVENOUS; SUBCUTANEOUS at 22:30

## 2023-10-02 RX ADMIN — IOPAMIDOL 75 ML: 755 INJECTION, SOLUTION INTRAVENOUS at 15:28

## 2023-10-02 RX ADMIN — SODIUM ZIRCONIUM CYCLOSILICATE 10 G: 10 POWDER, FOR SUSPENSION ORAL at 16:48

## 2023-10-02 RX ADMIN — INSULIN HUMAN 10 UNITS: 100 INJECTION, SOLUTION PARENTERAL at 17:39

## 2023-10-02 RX ADMIN — CALCIUM GLUCONATE 1000 MG: 20 INJECTION, SOLUTION INTRAVENOUS at 16:35

## 2023-10-02 RX ADMIN — SODIUM BICARBONATE 50 MEQ: 84 INJECTION, SOLUTION INTRAVENOUS at 17:06

## 2023-10-02 RX ADMIN — DEXTROSE MONOHYDRATE 250 ML: 100 INJECTION, SOLUTION INTRAVENOUS at 17:08

## 2023-10-02 RX ADMIN — SODIUM CHLORIDE, PRESERVATIVE FREE 10 ML: 5 INJECTION INTRAVENOUS at 21:28

## 2023-10-02 RX ADMIN — SODIUM BICARBONATE 650 MG TABLET 650 MG: at 21:24

## 2023-10-02 RX ADMIN — ALBUTEROL SULFATE 10 MG: 2.5 SOLUTION RESPIRATORY (INHALATION) at 16:22

## 2023-10-02 RX ADMIN — FUROSEMIDE 40 MG: 10 INJECTION, SOLUTION INTRAMUSCULAR; INTRAVENOUS at 16:42

## 2023-10-02 RX ADMIN — SODIUM ZIRCONIUM CYCLOSILICATE 10 G: 10 POWDER, FOR SUSPENSION ORAL at 21:24

## 2023-10-02 ASSESSMENT — LIFESTYLE VARIABLES
HOW MANY STANDARD DRINKS CONTAINING ALCOHOL DO YOU HAVE ON A TYPICAL DAY: PATIENT DOES NOT DRINK
HOW OFTEN DO YOU HAVE A DRINK CONTAINING ALCOHOL: NEVER

## 2023-10-02 ASSESSMENT — PAIN - FUNCTIONAL ASSESSMENT: PAIN_FUNCTIONAL_ASSESSMENT: 0-10

## 2023-10-02 ASSESSMENT — PAIN DESCRIPTION - LOCATION: LOCATION: NECK

## 2023-10-02 ASSESSMENT — PAIN DESCRIPTION - DESCRIPTORS: DESCRIPTORS: ACHING;DISCOMFORT;SHARP

## 2023-10-02 ASSESSMENT — ENCOUNTER SYMPTOMS
GASTROINTESTINAL NEGATIVE: 1
SHORTNESS OF BREATH: 1

## 2023-10-02 ASSESSMENT — PAIN SCALES - GENERAL: PAINLEVEL_OUTOF10: 8

## 2023-10-02 ASSESSMENT — PAIN DESCRIPTION - FREQUENCY: FREQUENCY: INTERMITTENT

## 2023-10-02 ASSESSMENT — PAIN DESCRIPTION - PAIN TYPE: TYPE: ACUTE PAIN

## 2023-10-02 NOTE — H&P
There are no filling defects within the main or proximal segmental pulmonary artery suggestive of emboli. The thoracic aorta is well-opacified without dissection. There is no thoracic adenopathy. There is no consolidation, pleural effusions or pulmonary edema. Included portions of the upper abdomen demonstrate normal-appearing adrenal glands. There are multiple indeterminate low attenuation masses within the spleen measuring up to 3.5 cm in diameter (image 112). Endovascular coils are present within the left kidney. Bone windows demonstrate no aggressive osseous lesions. 1. No pulmonary embolism. 2. Multiple low-attenuation splenic lesions measuring up to 3.5 cm in diameter. Etiology is uncertain and although rare, metastatic disease or other pathologic entities including lymphoma could account for this appearance. Correlate with medical history and with routine follow-up. XR KNEE LEFT (3 VIEWS)    Result Date: 10/2/2023  LEFT KNEE SERIES HISTORY: Pain COMPARISON: None FINDINGS: Osteoarthritis is present. There is no displaced fracture or dislocation. Soft tissues are normal.     Osteoarthritis. CT Head W/O Contrast    Result Date: 10/2/2023  HEAD CT WITHOUT CONTRAST  10/2/2023 HISTORY:   dizziness TECHNIQUE: Noncontrast axial images were obtained through the brain. All CT scans at this facility used dose modulation, interactive reconstruction and/or weight based dosing when appropriate to reduce radiation dose to as low as reasonably achievable. COMPARISON: None FINDINGS: There is no acute intracranial hemorrhage, significant mass effect or CT evidence of acute large artery territorial infarction. Please note that a hyperacute infarct or small vessel infarct may not be apparent on initial CT imaging. Mild cerebral volume loss is present. There is no hydrocephalus , intra-axial mass or abnormal extra-axial fluid collection. There are no displaced skull fractures.  The mastoid air cells and paranasal

## 2023-10-02 NOTE — ED TRIAGE NOTES
Pt reports 1wk dyspnea on exertion, pain to posterior neck radiating down back and dizziness with standing.  ROM of neck intact (-)numbness, new vision changes (hx cataracts) (+)tingling/cold to R hand intermittently   Fistula LUE, last dialysis Saturday (full session)    A&Ox4

## 2023-10-02 NOTE — ED PROVIDER NOTES
ED ATTENDING SHARED SERVICES NOTE:     I have seen and evaluated the patient and performed an independent history and physical exam, and I agree with the assessment and plan as documented in the advanced provider note. I performed the medical decision making in its entirety. With the following updates: I agree with plan to admit patient for hyperkalemia. He has not missed dialysis is not fluid overloaded but given that the second potassium also came back very elevated we will treat aggressively. EKG is a normal rate but it was hard to see P waves on it. I discussed the need to follow-up for his spleen lesions as well. See Read Hoang Cruz's full H&P for more details but I agree with her plan and patient looks well currently.     10/2/23 5:11 PM EDT MD Guillermo Gandhi MD  10/02/23 8095
with palpable thrill  Pulmonary:      Effort: Pulmonary effort is normal.      Breath sounds: Normal breath sounds. Abdominal:      Palpations: Abdomen is soft. Tenderness: There is no abdominal tenderness. Hernia: A hernia is present. Comments: Large right-sided may be ventral hernia. Nontender. Chronic and unchanged   Musculoskeletal:         General: No swelling or tenderness. Normal range of motion. Cervical back: Normal range of motion and neck supple. No rigidity or tenderness. Comments: Full range of motion in left knee nontender, but patient walks with a limp favoring the left knee   Lymphadenopathy:      Cervical: No cervical adenopathy. Skin:     General: Skin is warm and dry. Findings: No rash. Neurological:      General: No focal deficit present. Mental Status: He is alert and oriented to person, place, and time. Mental status is at baseline. Cranial Nerves: No cranial nerve deficit. Sensory: No sensory deficit. Motor: No weakness. Gait: Gait normal.   Psychiatric:         Mood and Affect: Mood normal.         Behavior: Behavior normal.         Thought Content:  Thought content normal.          Procedures     Critical Care    Performed by: BREEYZ Ortega  Authorized by: Ermelinda Cramer DO    Critical care provider statement:     Critical care time (minutes):  72    Critical care was necessary to treat or prevent imminent or life-threatening deterioration of the following conditions:  Metabolic crisis    Critical care was time spent personally by me on the following activities:  Evaluation of patient's response to treatment, examination of patient, ordering and performing treatments and interventions, ordering and review of laboratory studies, ordering and review of radiographic studies, pulse oximetry, re-evaluation of patient's condition and review of old charts      Orders Placed This Encounter   Procedures    Critical Care    COVID-19,

## 2023-10-03 LAB
ALBUMIN SERPL-MCNC: 3.2 G/DL (ref 3.2–4.6)
ALBUMIN/GLOB SERPL: 0.8 (ref 0.4–1.6)
ALP SERPL-CCNC: 66 U/L (ref 50–136)
ALT SERPL-CCNC: 14 U/L (ref 12–65)
ANION GAP SERPL CALC-SCNC: 10 MMOL/L (ref 2–11)
AST SERPL-CCNC: 11 U/L (ref 15–37)
BASOPHILS # BLD: 0.1 K/UL (ref 0–0.2)
BASOPHILS NFR BLD: 1 % (ref 0–2)
BILIRUB SERPL-MCNC: 0.4 MG/DL (ref 0.2–1.1)
BUN SERPL-MCNC: 63 MG/DL (ref 8–23)
CALCIUM SERPL-MCNC: 10 MG/DL (ref 8.3–10.4)
CALCIUM SERPL-MCNC: 10.1 MG/DL (ref 8.3–10.4)
CHLORIDE SERPL-SCNC: 101 MMOL/L (ref 101–110)
CO2 SERPL-SCNC: 30 MMOL/L (ref 21–32)
CREAT SERPL-MCNC: 12.5 MG/DL (ref 0.8–1.5)
DIFFERENTIAL METHOD BLD: ABNORMAL
EKG ATRIAL RATE: 102 BPM
EKG DIAGNOSIS: NORMAL
EKG Q-T INTERVAL: 358 MS
EKG QRS DURATION: 90 MS
EKG QTC CALCULATION (BAZETT): 464 MS
EKG R AXIS: 19 DEGREES
EKG T AXIS: 42 DEGREES
EKG VENTRICULAR RATE: 101 BPM
EOSINOPHIL # BLD: 0.3 K/UL (ref 0–0.8)
EOSINOPHIL NFR BLD: 3 % (ref 0.5–7.8)
ERYTHROCYTE [DISTWIDTH] IN BLOOD BY AUTOMATED COUNT: 13.7 % (ref 11.9–14.6)
FERRITIN SERPL-MCNC: 1421 NG/ML (ref 8–388)
FOLATE SERPL-MCNC: 22.2 NG/ML (ref 3.1–17.5)
GLOBULIN SER CALC-MCNC: 3.8 G/DL (ref 2.8–4.5)
GLUCOSE BLD STRIP.AUTO-MCNC: 122 MG/DL (ref 65–100)
GLUCOSE SERPL-MCNC: 116 MG/DL (ref 65–100)
HCT VFR BLD AUTO: 35.6 % (ref 41.1–50.3)
HGB BLD-MCNC: 11.2 G/DL (ref 13.6–17.2)
IMM GRANULOCYTES # BLD AUTO: 0 K/UL (ref 0–0.5)
IMM GRANULOCYTES NFR BLD AUTO: 0 % (ref 0–5)
IRON SATN MFR SERPL: 54 %
IRON SERPL-MCNC: 91 UG/DL (ref 35–150)
LYMPHOCYTES # BLD: 1.9 K/UL (ref 0.5–4.6)
LYMPHOCYTES NFR BLD: 19 % (ref 13–44)
MAGNESIUM SERPL-MCNC: 2.7 MG/DL (ref 1.8–2.4)
MCH RBC QN AUTO: 32.4 PG (ref 26.1–32.9)
MCHC RBC AUTO-ENTMCNC: 31.5 G/DL (ref 31.4–35)
MCV RBC AUTO: 102.9 FL (ref 82–102)
MONOCYTES # BLD: 1 K/UL (ref 0.1–1.3)
MONOCYTES NFR BLD: 10 % (ref 4–12)
NEUTS SEG # BLD: 6.8 K/UL (ref 1.7–8.2)
NEUTS SEG NFR BLD: 67 % (ref 43–78)
NRBC # BLD: 0 K/UL (ref 0–0.2)
PATH REV BLD -IMP: NORMAL
PHOSPHATE SERPL-MCNC: 6.4 MG/DL (ref 2.3–3.7)
PLATELET # BLD AUTO: 206 K/UL (ref 150–450)
PMV BLD AUTO: 11.1 FL (ref 9.4–12.3)
POTASSIUM SERPL-SCNC: 5.6 MMOL/L (ref 3.5–5.1)
PROT SERPL-MCNC: 7 G/DL (ref 6.3–8.2)
PTH-INTACT SERPL-MCNC: 168.9 PG/ML (ref 18.5–88)
RBC # BLD AUTO: 3.46 M/UL (ref 4.23–5.6)
SERVICE CMNT-IMP: ABNORMAL
SODIUM SERPL-SCNC: 141 MMOL/L (ref 133–143)
TIBC SERPL-MCNC: 170 UG/DL (ref 250–450)
VIT B12 SERPL-MCNC: 1011 PG/ML (ref 193–986)
WBC # BLD AUTO: 10 K/UL (ref 4.3–11.1)

## 2023-10-03 PROCEDURE — 82746 ASSAY OF FOLIC ACID SERUM: CPT

## 2023-10-03 PROCEDURE — 6370000000 HC RX 637 (ALT 250 FOR IP): Performed by: NURSE PRACTITIONER

## 2023-10-03 PROCEDURE — 90935 HEMODIALYSIS ONE EVALUATION: CPT

## 2023-10-03 PROCEDURE — 84100 ASSAY OF PHOSPHORUS: CPT

## 2023-10-03 PROCEDURE — 1100000003 HC PRIVATE W/ TELEMETRY

## 2023-10-03 PROCEDURE — 82728 ASSAY OF FERRITIN: CPT

## 2023-10-03 PROCEDURE — 97161 PT EVAL LOW COMPLEX 20 MIN: CPT

## 2023-10-03 PROCEDURE — 6360000002 HC RX W HCPCS: Performed by: FAMILY MEDICINE

## 2023-10-03 PROCEDURE — 83540 ASSAY OF IRON: CPT

## 2023-10-03 PROCEDURE — 2580000003 HC RX 258: Performed by: FAMILY MEDICINE

## 2023-10-03 PROCEDURE — 83735 ASSAY OF MAGNESIUM: CPT

## 2023-10-03 PROCEDURE — 36415 COLL VENOUS BLD VENIPUNCTURE: CPT

## 2023-10-03 PROCEDURE — 93010 ELECTROCARDIOGRAM REPORT: CPT | Performed by: INTERNAL MEDICINE

## 2023-10-03 PROCEDURE — 5A1D70Z PERFORMANCE OF URINARY FILTRATION, INTERMITTENT, LESS THAN 6 HOURS PER DAY: ICD-10-PCS | Performed by: STUDENT IN AN ORGANIZED HEALTH CARE EDUCATION/TRAINING PROGRAM

## 2023-10-03 PROCEDURE — 83970 ASSAY OF PARATHORMONE: CPT

## 2023-10-03 PROCEDURE — 93005 ELECTROCARDIOGRAM TRACING: CPT | Performed by: HOSPITALIST

## 2023-10-03 PROCEDURE — 80053 COMPREHEN METABOLIC PANEL: CPT

## 2023-10-03 PROCEDURE — 97530 THERAPEUTIC ACTIVITIES: CPT

## 2023-10-03 PROCEDURE — 83550 IRON BINDING TEST: CPT

## 2023-10-03 PROCEDURE — 85025 COMPLETE CBC W/AUTO DIFF WBC: CPT

## 2023-10-03 PROCEDURE — 6370000000 HC RX 637 (ALT 250 FOR IP): Performed by: FAMILY MEDICINE

## 2023-10-03 PROCEDURE — 82607 VITAMIN B-12: CPT

## 2023-10-03 PROCEDURE — 82962 GLUCOSE BLOOD TEST: CPT

## 2023-10-03 RX ORDER — POLYETHYLENE GLYCOL 3350 17 G/17G
17 POWDER, FOR SOLUTION ORAL 2 TIMES DAILY
Status: DISCONTINUED | OUTPATIENT
Start: 2023-10-03 | End: 2023-10-04 | Stop reason: HOSPADM

## 2023-10-03 RX ORDER — DOCUSATE SODIUM 100 MG/1
100 CAPSULE, LIQUID FILLED ORAL DAILY
Status: DISCONTINUED | OUTPATIENT
Start: 2023-10-04 | End: 2023-10-04 | Stop reason: HOSPADM

## 2023-10-03 RX ORDER — SEVELAMER CARBONATE 800 MG/1
800 TABLET, FILM COATED ORAL
Status: DISCONTINUED | OUTPATIENT
Start: 2023-10-03 | End: 2023-10-04 | Stop reason: HOSPADM

## 2023-10-03 RX ORDER — BENZONATATE 100 MG/1
100 CAPSULE ORAL 3 TIMES DAILY PRN
Status: DISCONTINUED | OUTPATIENT
Start: 2023-10-03 | End: 2023-10-04 | Stop reason: HOSPADM

## 2023-10-03 RX ADMIN — SODIUM BICARBONATE 650 MG TABLET 650 MG: at 13:10

## 2023-10-03 RX ADMIN — POLYETHYLENE GLYCOL 3350 17 G: 17 POWDER, FOR SOLUTION ORAL at 22:10

## 2023-10-03 RX ADMIN — ASPIRIN 81 MG: 81 TABLET ORAL at 13:10

## 2023-10-03 RX ADMIN — SODIUM ZIRCONIUM CYCLOSILICATE 10 G: 10 POWDER, FOR SUSPENSION ORAL at 21:38

## 2023-10-03 RX ADMIN — SEVELAMER CARBONATE 800 MG: 800 TABLET, FILM COATED ORAL at 16:22

## 2023-10-03 RX ADMIN — SODIUM CHLORIDE, PRESERVATIVE FREE 10 ML: 5 INJECTION INTRAVENOUS at 13:11

## 2023-10-03 RX ADMIN — HEPARIN SODIUM 5000 UNITS: 5000 INJECTION INTRAVENOUS; SUBCUTANEOUS at 13:11

## 2023-10-03 RX ADMIN — SEVELAMER CARBONATE 800 MG: 800 TABLET, FILM COATED ORAL at 13:10

## 2023-10-03 RX ADMIN — BENZONATATE 100 MG: 100 CAPSULE ORAL at 22:10

## 2023-10-03 RX ADMIN — CALCITRIOL 0.25 MCG: 0.25 CAPSULE ORAL at 13:10

## 2023-10-03 RX ADMIN — HEPARIN SODIUM 5000 UNITS: 5000 INJECTION INTRAVENOUS; SUBCUTANEOUS at 05:48

## 2023-10-03 RX ADMIN — SODIUM ZIRCONIUM CYCLOSILICATE 10 G: 10 POWDER, FOR SUSPENSION ORAL at 13:10

## 2023-10-03 RX ADMIN — SODIUM BICARBONATE 650 MG TABLET 650 MG: at 21:38

## 2023-10-03 RX ADMIN — SODIUM BICARBONATE 650 MG TABLET 650 MG: at 16:22

## 2023-10-03 RX ADMIN — SODIUM CHLORIDE, PRESERVATIVE FREE 10 ML: 5 INJECTION INTRAVENOUS at 21:39

## 2023-10-03 RX ADMIN — HEPARIN SODIUM 5000 UNITS: 5000 INJECTION INTRAVENOUS; SUBCUTANEOUS at 21:38

## 2023-10-03 ASSESSMENT — PAIN SCALES - GENERAL: PAINLEVEL_OUTOF10: 0

## 2023-10-03 NOTE — ED NOTES
TRANSFER - OUT REPORT:    Verbal report given to [facility] RN on Anju Antoine  being transferred to bed 335 for routine progression of patient care       Report consisted of patient's Situation, Background, Assessment and   Recommendations(SBAR). Information from the following report(s) ED Encounter Summary was reviewed with the receiving nurse. Kitty Fall Assessment:    Presents to emergency department  because of falls (Syncope, seizure, or loss of consciousness): No  Age > 70: No  Altered Mental Status, Intoxication with alcohol or substance confusion (Disorientation, impaired judgment, poor safety awaremess, or inability to follow instructions): No  Impaired Mobility: Ambulates or transfers with assistive devices or assistance; Unable to ambulate or transer.: No  Nursing Judgement: No          Lines:   Peripheral IV 10/02/23 Right Antecubital (Active)   Site Assessment Clean, dry & intact 10/02/23 1147   Line Status Blood return noted; Flushed 10/02/23 1147   Phlebitis Assessment No symptoms 10/02/23 1147   Infiltration Assessment 0 10/02/23 1147        Opportunity for questions and clarification was provided.       Patient transported with:  Katlin Pretty, [address]  10/03/23 7819

## 2023-10-03 NOTE — DIALYSIS
TRANSFER IN - DIALYSIS    Received patient in dialysis unit from [facility] (unit) for ordered procedure. Consent verified for renal replacement therapy. Procedure explained to patient, opportunity for Q&A provided. Call light given. Patient alert and vital signs hypertensive. /72 , P 85 , on room air. Hemodialysis initiated using LUE AV access and 15 g needles. Machine settings per MD order. No heparin. Will monitor during treatment.

## 2023-10-03 NOTE — CONSULTS
GREATER THAN 5 YEARS 8/27/2019 [facility] RADIOLOGY SPECIALS      Current Facility-Administered Medications   Medication Dose Route Frequency    glucose chewable tablet 16 g  4 tablet Oral PRN    dextrose bolus 10% 125 mL  125 mL IntraVENous PRN    Or    dextrose bolus 10% 250 mL  250 mL IntraVENous PRN    Glucagon Emergency KIT 1 mg  1 mg SubCUTAneous PRN    dextrose 10 % infusion   IntraVENous Continuous PRN    sodium zirconium cyclosilicate (LOKELMA) oral suspension 10 g  10 g Oral TID    sodium bicarbonate tablet 650 mg  650 mg Oral 4x Daily    sodium chloride flush 0.9 % injection 5-40 mL  5-40 mL IntraVENous 2 times per day    sodium chloride flush 0.9 % injection 5-40 mL  5-40 mL IntraVENous PRN    0.9 % sodium chloride infusion   IntraVENous PRN    heparin (porcine) injection 5,000 Units  5,000 Units SubCUTAneous 3 times per day    ondansetron (ZOFRAN-ODT) disintegrating tablet 4 mg  4 mg Oral Q8H PRN    Or    ondansetron (ZOFRAN) injection 4 mg  4 mg IntraVENous Q6H PRN    acetaminophen (TYLENOL) tablet 650 mg  650 mg Oral Q6H PRN    Or    acetaminophen (TYLENOL) suppository 650 mg  650 mg Rectal Q6H PRN    aspirin EC tablet 81 mg  81 mg Oral Daily    calcitRIOL (ROCALTROL) capsule 0.25 mcg  0.25 mcg Oral Daily     Allergies   Allergen Reactions    Penicillins Hives      Social History     Tobacco Use    Smoking status: Never    Smokeless tobacco: Never   Substance Use Topics    Alcohol use: No      Family History   Problem Relation Age of Onset    Stroke Sister     Cancer Sister     Diabetes Mother     Heart Disease Sister         Review of Systems  Gen - no fever, no chills, appetite okay  HEENT - no sore throat  CV - no chest pain, no palpitation, no orthopnea  Lung - + shortness of breath, + cough, no hemoptysis  Abd - no tenderness, no nausea/vomiting, no bloody stool  Ext - no edema, no clubbing, no cyanosis  Musculoskeletal - no joint pain, no back pain  Neurologic - no headaches, + dizziness, no

## 2023-10-03 NOTE — CARE COORDINATION
Patient admitted with hyperkalemia, diminished renal excretion. HD today. Patient is on OP HD at [facility]. TTS. CM scanned medical record for discharge needs. Patient is established with PCP and insured for follow up. CM will follow for discharge needs. 10/03/23 1524   Service Assessment   Patient Orientation Alert and Oriented   Cognition Alert   History Provided By Medical Record   Primary Caregiver Self   Accompanied By/Relationship Unknown   Support Systems Children;Spouse/Significant Other   PCP Verified by CM Yes   Last Visit to PCP Within last 6 months   Prior Functional Level Independent in ADLs/IADLs   Current Functional Level Independent in ADLs/IADLs   Can patient return to prior living arrangement Yes   Ability to make needs known: Good   Family able to assist with home care needs: Yes   Would you like for me to discuss the discharge plan with any other family members/significant others, and if so, who? No   Financial Resources [employer] None   Social/Functional History   Lives With Significant other;Family   Type of Home House   Home Layout One level   Bathroom Shower/Tub Tub/Shower unit; Shower chair with back   [facility]   ADL Assistance Independent   Ambulation Assistance Independent   Transfer Assistance Independent   Active  Yes   Mode of Transportation Car   Occupation Retired   Discharge Planning   Type of Residence Other (Comment)  (Duplex)   Living Arrangements Children   Current Services Prior To Admission Other (Comment)  (OP HD)   Potential Assistance Needed N/A   DME Ordered? No   Potential Assistance Purchasing Medications No   Type of Home Care Services None   Patient expects to be discharged to: Apartment  (Duplex)   Services At/After Discharge   Transition of Care Consult (CM Consult) Discharge [facility] Discharge None   [place] Information Provided?  No   Mode of

## 2023-10-04 VITALS
DIASTOLIC BLOOD PRESSURE: 63 MMHG | BODY MASS INDEX: 41.91 KG/M2 | SYSTOLIC BLOOD PRESSURE: 114 MMHG | RESPIRATION RATE: 18 BRPM | HEART RATE: 95 BPM | OXYGEN SATURATION: 94 % | HEIGHT: 68 IN | TEMPERATURE: 98.6 F | WEIGHT: 276.5 LBS

## 2023-10-04 LAB
ALBUMIN SERPL-MCNC: 2.8 G/DL (ref 3.2–4.6)
ALBUMIN/GLOB SERPL: 0.7 (ref 0.4–1.6)
ALP SERPL-CCNC: 59 U/L (ref 50–136)
ALT SERPL-CCNC: 14 U/L (ref 12–65)
ANION GAP SERPL CALC-SCNC: 7 MMOL/L (ref 2–11)
AST SERPL-CCNC: 11 U/L (ref 15–37)
BASOPHILS # BLD: 0.1 K/UL (ref 0–0.2)
BASOPHILS NFR BLD: 1 % (ref 0–2)
BILIRUB SERPL-MCNC: 0.3 MG/DL (ref 0.2–1.1)
BUN SERPL-MCNC: 37 MG/DL (ref 8–23)
CALCIUM SERPL-MCNC: 9 MG/DL (ref 8.3–10.4)
CHLORIDE SERPL-SCNC: 101 MMOL/L (ref 101–110)
CO2 SERPL-SCNC: 32 MMOL/L (ref 21–32)
CREAT SERPL-MCNC: 8.4 MG/DL (ref 0.8–1.5)
DIFFERENTIAL METHOD BLD: ABNORMAL
EOSINOPHIL # BLD: 0.3 K/UL (ref 0–0.8)
EOSINOPHIL NFR BLD: 3 % (ref 0.5–7.8)
ERYTHROCYTE [DISTWIDTH] IN BLOOD BY AUTOMATED COUNT: 13.6 % (ref 11.9–14.6)
GLOBULIN SER CALC-MCNC: 4.1 G/DL (ref 2.8–4.5)
GLUCOSE SERPL-MCNC: 139 MG/DL (ref 65–100)
HCT VFR BLD AUTO: 34.9 % (ref 41.1–50.3)
HCV AB SERPL QL IA: NORMAL
HCV IGG SERPL QL IA: NON REACTIVE S/CO RATIO
HGB BLD-MCNC: 10.9 G/DL (ref 13.6–17.2)
IMM GRANULOCYTES # BLD AUTO: 0 K/UL (ref 0–0.5)
IMM GRANULOCYTES NFR BLD AUTO: 0 % (ref 0–5)
KAPPA LC FREE SER-MCNC: 222.4 MG/L (ref 3.3–19.4)
KAPPA LC FREE/LAMBDA FREE SER: 1.51 (ref 0.26–1.65)
LAMBDA LC FREE SERPL-MCNC: 146.8 MG/L (ref 5.7–26.3)
LYMPHOCYTES # BLD: 1.8 K/UL (ref 0.5–4.6)
LYMPHOCYTES NFR BLD: 18 % (ref 13–44)
MAGNESIUM SERPL-MCNC: 2.5 MG/DL (ref 1.8–2.4)
MCH RBC QN AUTO: 32.1 PG (ref 26.1–32.9)
MCHC RBC AUTO-ENTMCNC: 31.2 G/DL (ref 31.4–35)
MCV RBC AUTO: 102.6 FL (ref 82–102)
MONOCYTES # BLD: 1.1 K/UL (ref 0.1–1.3)
MONOCYTES NFR BLD: 11 % (ref 4–12)
NEUTS SEG # BLD: 6.8 K/UL (ref 1.7–8.2)
NEUTS SEG NFR BLD: 67 % (ref 43–78)
NRBC # BLD: 0 K/UL (ref 0–0.2)
PLATELET # BLD AUTO: 206 K/UL (ref 150–450)
PMV BLD AUTO: 10.9 FL (ref 9.4–12.3)
POTASSIUM SERPL-SCNC: 4.4 MMOL/L (ref 3.5–5.1)
PROT SERPL-MCNC: 6.9 G/DL (ref 6.3–8.2)
RBC # BLD AUTO: 3.4 M/UL (ref 4.23–5.6)
SODIUM SERPL-SCNC: 140 MMOL/L (ref 133–143)
WBC # BLD AUTO: 10.1 K/UL (ref 4.3–11.1)

## 2023-10-04 PROCEDURE — 36415 COLL VENOUS BLD VENIPUNCTURE: CPT

## 2023-10-04 PROCEDURE — 83735 ASSAY OF MAGNESIUM: CPT

## 2023-10-04 PROCEDURE — 85025 COMPLETE CBC W/AUTO DIFF WBC: CPT

## 2023-10-04 PROCEDURE — 6360000002 HC RX W HCPCS: Performed by: FAMILY MEDICINE

## 2023-10-04 PROCEDURE — 80053 COMPREHEN METABOLIC PANEL: CPT

## 2023-10-04 RX ADMIN — HEPARIN SODIUM 5000 UNITS: 5000 INJECTION INTRAVENOUS; SUBCUTANEOUS at 06:13

## 2023-10-04 NOTE — PLAN OF CARE
Problem: Discharge Planning  Goal: Discharge to home or other facility with appropriate resources  10/3/2023 2203 by Adam Thomas RN  Outcome: Progressing  10/3/2023 1342 by Joanna Loera RN  Outcome: Progressing     Problem: Pain  Goal: Verbalizes/displays adequate comfort level or baseline comfort level  10/3/2023 2203 by Adam Thomas RN  Outcome: Progressing  10/3/2023 1342 by Joanna Loera RN  Outcome: Progressing     Problem: ABCDS Injury Assessment  Goal: Absence of physical injury  10/3/2023 1342 by Joanna Loera RN  Outcome: Progressing     Problem: Safety - Adult  Goal: Free from fall injury  10/3/2023 2203 by Adam Thomas RN  Outcome: Progressing  10/3/2023 1342 by Joanna Loera RN  Outcome: Progressing

## 2023-10-04 NOTE — DISCHARGE SUMMARY
Hospitalist Discharge Summary   Admit Date:  10/2/2023 11:31 AM   DC Note date: 10/4/2023  Name:  Shawn Enriquez   Age:  70 y.o. Sex:  male  :  1955   MRN:  [id number]   Room:  [facility]  PCP:  Felice Jackson MD    Presenting Complaint: Shortness of Breath, Dizziness, and Neck Pain     Initial Admission Diagnosis: Hyperkalemia [E87.5]  Hyperkalemia, diminished renal excretion [E87.5]  Splenic lesion [D73.89]  Dyspnea, unspecified type [R06.00]     Problem List for this Hospitalization (present on admission):    Principal Problem:    Hyperkalemia, diminished renal excretion  Active Problems:    Constipation    Morbid obesity ([address])    Hyperparathyroidism, secondary renal ([address])    Multiple myeloma ([address])    Hypertensive heart and chronic kidney disease with heart failure and with stage 5 chronic kidney disease, or end stage renal disease ([address])    Gout due to renal impairment, unspecified site    Diastolic CHF, chronic ([address])    ESRD on dialysis ([address])    Monoclonal gammopathy of unknown significance (MGUS)  Resolved Problems:    * No resolved hospital problems. *      Hospital Course:  Please see H&P for detailed history    In summary, Shawn Enriquez is a 70 y.o. male with medical history of ESRD on hemodialysis since , diabetes mellitus type 2, hypertension, morbid obesity, congestive heart failure with preserved ejection fraction, MGUS, BPH, abdominal hernia, gout, presented to the ER with complaints of dyspnea on exertion for the past week associated with lightheadedness with standing worse after hemodialysis. He also has neck pain radiating to the back and tingling in the right hand intermittently. He feels that after hemodialysis, his right lower extremity gives out. He has been eating a lot of bananas lately. Anuric. He has history of MGUS but has not followed up with oncology in the years. Potassium grossly hemolyzed on admission. Repeat on POC 7.6. EKG showed peaked T waves.

## 2023-10-05 NOTE — ADT AUTH CERT
sevelamer (RENVELA) tablet 800 mg      800 mg     Oral     TID WC            glucose chewable tablet 16 g      4 tablet     Oral     PRN            dextrose bolus 10% 125 mL      125 mL     IntraVENous     PRN             Or            dextrose bolus 10% 250 mL      250 mL     IntraVENous     PRN            Glucagon Emergency KIT 1 mg      1 mg     SubCUTAneous     PRN            dextrose 10 % infusion           IntraVENous     Continuous PRN            sodium zirconium cyclosilicate (LOKELMA) oral suspension 10 g      10 g     Oral     TID            sodium bicarbonate tablet 650 mg      650 mg     Oral     4x Daily            sodium chloride flush 0.9 % injection 5-40 mL      5-40 mL     IntraVENous     2 times per day            sodium chloride flush 0.9 % injection 5-40 mL      5-40 mL     IntraVENous     PRN            0.9 % sodium chloride infusion           IntraVENous     PRN            heparin (porcine) injection 5,000 Units      5,000 Units     SubCUTAneous     3 times per day            ondansetron (ZOFRAN-ODT) disintegrating tablet 4 mg      4 mg     Oral     Q8H PRN             Or            ondansetron (ZOFRAN) injection 4 mg      4 mg     IntraVENous     Q6H PRN            acetaminophen (TYLENOL) tablet 650 mg      650 mg     Oral     Q6H PRN             Or            acetaminophen (TYLENOL) suppository 650 mg      650 mg     Rectal     Q6H PRN            aspirin EC tablet 81 mg      81 mg     Oral     Daily            calcitRIOL (ROCALTROL) capsule 0.25 mcg      0.25 mcg     Oral     Daily                 Signed:    Minor Petit MD         Part of this note may have been written by using a voice dictation software. The note has been proof read but may still contain some grammatical/other typographical errors.                                             ED to [facility] (Discharged) on 10/2/2023                            Revision History                            Detailed Report

## 2023-10-06 LAB
FLOW CYTOMETRY RESULTS: NORMAL
SPECIMEN SOURCE: NORMAL
TEST ORDERED: NORMAL

## 2023-10-09 LAB
ALBUMIN SERPL ELPH-MCNC: 3.3 G/DL (ref 2.9–4.4)
ALBUMIN/GLOB SERPL: 1.1 (ref 0.7–1.7)
ALPHA1 GLOB SERPL ELPH-MCNC: 0.2 G/DL (ref 0–0.4)
ALPHA2 GLOB SERPL ELPH-MCNC: 0.8 G/DL (ref 0.4–1)
B-GLOBULIN SERPL ELPH-MCNC: 0.8 G/DL (ref 0.7–1.3)
GAMMA GLOB SERPL ELPH-MCNC: 1.5 G/DL (ref 0.4–1.8)
GLOBULIN SER-MCNC: 3.3 G/DL (ref 2.2–3.9)
IGA SERPL-MCNC: 258 MG/DL (ref 61–437)
IGG SERPL-MCNC: 1482 MG/DL (ref 603–1613)
IGM SERPL-MCNC: 126 MG/DL (ref 20–172)
INTERPRETATION SERPL IEP-IMP: NORMAL
M PROTEIN SERPL ELPH-MCNC: NORMAL G/DL
PROT SERPL-MCNC: 6.6 G/DL (ref 6–8.5)